# Patient Record
Sex: FEMALE | Race: WHITE | NOT HISPANIC OR LATINO | Employment: FULL TIME | URBAN - METROPOLITAN AREA
[De-identification: names, ages, dates, MRNs, and addresses within clinical notes are randomized per-mention and may not be internally consistent; named-entity substitution may affect disease eponyms.]

---

## 2019-03-17 ENCOUNTER — HOSPITAL ENCOUNTER (EMERGENCY)
Facility: HOSPITAL | Age: 64
Discharge: HOME | End: 2019-03-17
Attending: EMERGENCY MEDICINE | Admitting: EMERGENCY MEDICINE

## 2019-03-17 VITALS
HEART RATE: 83 BPM | WEIGHT: 172.2 LBS | BODY MASS INDEX: 27.68 KG/M2 | HEIGHT: 66 IN | DIASTOLIC BLOOD PRESSURE: 65 MMHG | SYSTOLIC BLOOD PRESSURE: 108 MMHG | TEMPERATURE: 98.3 F | RESPIRATION RATE: 16 BRPM | OXYGEN SATURATION: 94 %

## 2019-03-17 DIAGNOSIS — R10.9 ABDOMINAL CRAMPS: ICD-10-CM

## 2019-03-17 DIAGNOSIS — R21 RASH: ICD-10-CM

## 2019-03-17 DIAGNOSIS — R11.0 NAUSEA: Primary | ICD-10-CM

## 2019-03-17 LAB
ALBUMIN SERPL-MCNC: 3.8 G/DL (ref 3.5–5.2)
ALBUMIN/GLOB SERPL: 1.1 G/DL
ALP SERPL-CCNC: 71 U/L (ref 39–117)
ALT SERPL W P-5'-P-CCNC: 15 U/L (ref 1–33)
ANION GAP SERPL CALCULATED.3IONS-SCNC: 11.6 MMOL/L
AST SERPL-CCNC: 19 U/L (ref 1–32)
BACTERIA UR QL AUTO: ABNORMAL /HPF
BASOPHILS # BLD AUTO: 0.01 10*3/MM3 (ref 0–0.2)
BASOPHILS NFR BLD AUTO: 0.1 % (ref 0–1.5)
BILIRUB SERPL-MCNC: 0.2 MG/DL (ref 0.1–1.2)
BILIRUB UR QL STRIP: NEGATIVE
BUN BLD-MCNC: 13 MG/DL (ref 8–23)
BUN/CREAT SERPL: 20.6 (ref 7–25)
CALCIUM SPEC-SCNC: 9.4 MG/DL (ref 8.6–10.5)
CHLORIDE SERPL-SCNC: 101 MMOL/L (ref 98–107)
CLARITY UR: CLEAR
CO2 SERPL-SCNC: 27.4 MMOL/L (ref 22–29)
COLOR UR: ABNORMAL
CREAT BLD-MCNC: 0.63 MG/DL (ref 0.57–1)
DEPRECATED RDW RBC AUTO: 47.2 FL (ref 37–54)
EOSINOPHIL # BLD AUTO: 0.27 10*3/MM3 (ref 0–0.4)
EOSINOPHIL NFR BLD AUTO: 3.1 % (ref 0.3–6.2)
ERYTHROCYTE [DISTWIDTH] IN BLOOD BY AUTOMATED COUNT: 13.9 % (ref 12.3–15.4)
GFR SERPL CREATININE-BSD FRML MDRD: 96 ML/MIN/1.73
GLOBULIN UR ELPH-MCNC: 3.5 GM/DL
GLUCOSE BLD-MCNC: 115 MG/DL (ref 65–99)
GLUCOSE UR STRIP-MCNC: NEGATIVE MG/DL
HCT VFR BLD AUTO: 39.9 % (ref 34–46.6)
HGB BLD-MCNC: 12.3 G/DL (ref 12–15.9)
HGB UR QL STRIP.AUTO: NEGATIVE
HYALINE CASTS UR QL AUTO: ABNORMAL /LPF
IMM GRANULOCYTES # BLD AUTO: 0.03 10*3/MM3 (ref 0–0.05)
IMM GRANULOCYTES NFR BLD AUTO: 0.3 % (ref 0–0.5)
KETONES UR QL STRIP: ABNORMAL
LEUKOCYTE ESTERASE UR QL STRIP.AUTO: ABNORMAL
LIPASE SERPL-CCNC: 8 U/L (ref 13–60)
LYMPHOCYTES # BLD AUTO: 1.22 10*3/MM3 (ref 0.7–3.1)
LYMPHOCYTES NFR BLD AUTO: 14.1 % (ref 19.6–45.3)
MCH RBC QN AUTO: 28.5 PG (ref 26.6–33)
MCHC RBC AUTO-ENTMCNC: 30.8 G/DL (ref 31.5–35.7)
MCV RBC AUTO: 92.6 FL (ref 79–97)
MONOCYTES # BLD AUTO: 0.51 10*3/MM3 (ref 0.1–0.9)
MONOCYTES NFR BLD AUTO: 5.9 % (ref 5–12)
MUCOUS THREADS URNS QL MICRO: ABNORMAL /HPF
NEUTROPHILS # BLD AUTO: 6.59 10*3/MM3 (ref 1.4–7)
NEUTROPHILS NFR BLD AUTO: 76.5 % (ref 42.7–76)
NITRITE UR QL STRIP: NEGATIVE
NRBC BLD AUTO-RTO: 0 /100 WBC (ref 0–0)
PH UR STRIP.AUTO: 7 [PH] (ref 5–8)
PLATELET # BLD AUTO: 386 10*3/MM3 (ref 140–450)
PMV BLD AUTO: 9.4 FL (ref 6–12)
POTASSIUM BLD-SCNC: 4.1 MMOL/L (ref 3.5–5.2)
PROT SERPL-MCNC: 7.3 G/DL (ref 6–8.5)
PROT UR QL STRIP: ABNORMAL
RBC # BLD AUTO: 4.31 10*6/MM3 (ref 3.77–5.28)
RBC # UR: ABNORMAL /HPF
REF LAB TEST METHOD: ABNORMAL
SODIUM BLD-SCNC: 140 MMOL/L (ref 136–145)
SP GR UR STRIP: >=1.03 (ref 1–1.03)
SQUAMOUS #/AREA URNS HPF: ABNORMAL /HPF
UROBILINOGEN UR QL STRIP: ABNORMAL
WBC CLUMPS # UR AUTO: ABNORMAL /HPF
WBC NRBC COR # BLD: 8.63 10*3/MM3 (ref 3.4–10.8)
WBC UR QL AUTO: ABNORMAL /HPF

## 2019-03-17 PROCEDURE — 96374 THER/PROPH/DIAG INJ IV PUSH: CPT

## 2019-03-17 PROCEDURE — 83690 ASSAY OF LIPASE: CPT | Performed by: PHYSICIAN ASSISTANT

## 2019-03-17 PROCEDURE — 80053 COMPREHEN METABOLIC PANEL: CPT | Performed by: PHYSICIAN ASSISTANT

## 2019-03-17 PROCEDURE — 25010000002 METHYLPREDNISOLONE PER 125 MG: Performed by: PHYSICIAN ASSISTANT

## 2019-03-17 PROCEDURE — 81001 URINALYSIS AUTO W/SCOPE: CPT | Performed by: PHYSICIAN ASSISTANT

## 2019-03-17 PROCEDURE — 96375 TX/PRO/DX INJ NEW DRUG ADDON: CPT

## 2019-03-17 PROCEDURE — 25010000002 HYDROMORPHONE PER 4 MG: Performed by: EMERGENCY MEDICINE

## 2019-03-17 PROCEDURE — 25010000002 ONDANSETRON PER 1 MG: Performed by: PHYSICIAN ASSISTANT

## 2019-03-17 PROCEDURE — 99283 EMERGENCY DEPT VISIT LOW MDM: CPT

## 2019-03-17 PROCEDURE — 96361 HYDRATE IV INFUSION ADD-ON: CPT

## 2019-03-17 PROCEDURE — 85025 COMPLETE CBC W/AUTO DIFF WBC: CPT | Performed by: PHYSICIAN ASSISTANT

## 2019-03-17 RX ORDER — CLOBETASOL PROPIONATE 0.5 MG/G
CREAM TOPICAL 2 TIMES DAILY
COMMUNITY
End: 2020-03-31

## 2019-03-17 RX ORDER — ONDANSETRON 4 MG/1
4-8 TABLET, ORALLY DISINTEGRATING ORAL EVERY 8 HOURS PRN
Qty: 15 TABLET | Refills: 0 | Status: SHIPPED | OUTPATIENT
Start: 2019-03-17 | End: 2020-03-31

## 2019-03-17 RX ORDER — MESALAMINE 1.2 G/1
1200 TABLET, DELAYED RELEASE ORAL
COMMUNITY
End: 2020-03-27 | Stop reason: SDUPTHER

## 2019-03-17 RX ORDER — METHYLPREDNISOLONE SODIUM SUCCINATE 125 MG/2ML
125 INJECTION, POWDER, LYOPHILIZED, FOR SOLUTION INTRAMUSCULAR; INTRAVENOUS ONCE
Status: COMPLETED | OUTPATIENT
Start: 2019-03-17 | End: 2019-03-17

## 2019-03-17 RX ORDER — CARVEDILOL 12.5 MG/1
12.5 TABLET ORAL 2 TIMES DAILY WITH MEALS
COMMUNITY
End: 2022-05-02

## 2019-03-17 RX ORDER — DICYCLOMINE HYDROCHLORIDE 10 MG/ML
20 INJECTION INTRAMUSCULAR ONCE
Status: DISCONTINUED | OUTPATIENT
Start: 2019-03-17 | End: 2019-03-17

## 2019-03-17 RX ORDER — ONDANSETRON 2 MG/ML
4 INJECTION INTRAMUSCULAR; INTRAVENOUS ONCE
Status: COMPLETED | OUTPATIENT
Start: 2019-03-17 | End: 2019-03-17

## 2019-03-17 RX ORDER — PREDNISONE 20 MG/1
TABLET ORAL
Qty: 13 TABLET | Refills: 0 | Status: SHIPPED | OUTPATIENT
Start: 2019-03-17 | End: 2020-03-31

## 2019-03-17 RX ORDER — HYDROMORPHONE HYDROCHLORIDE 1 MG/ML
0.5 INJECTION, SOLUTION INTRAMUSCULAR; INTRAVENOUS; SUBCUTANEOUS ONCE
Status: COMPLETED | OUTPATIENT
Start: 2019-03-17 | End: 2019-03-17

## 2019-03-17 RX ORDER — LISINOPRIL 5 MG/1
5 TABLET ORAL DAILY
COMMUNITY
End: 2022-12-19

## 2019-03-17 RX ORDER — HYDROXYZINE HYDROCHLORIDE 25 MG/1
25-50 TABLET, FILM COATED ORAL EVERY 6 HOURS PRN
Qty: 30 TABLET | Refills: 0 | Status: SHIPPED | OUTPATIENT
Start: 2019-03-17 | End: 2020-03-31 | Stop reason: ALTCHOICE

## 2019-03-17 RX ADMIN — HYDROMORPHONE HYDROCHLORIDE 0.5 MG: 1 INJECTION, SOLUTION INTRAMUSCULAR; INTRAVENOUS; SUBCUTANEOUS at 15:58

## 2019-03-17 RX ADMIN — SODIUM CHLORIDE, POTASSIUM CHLORIDE, SODIUM LACTATE AND CALCIUM CHLORIDE 1000 ML: 600; 310; 30; 20 INJECTION, SOLUTION INTRAVENOUS at 15:58

## 2019-03-17 RX ADMIN — ONDANSETRON HYDROCHLORIDE 4 MG: 2 SOLUTION INTRAMUSCULAR; INTRAVENOUS at 15:58

## 2019-03-17 RX ADMIN — METHYLPREDNISOLONE SODIUM SUCCINATE 125 MG: 125 INJECTION, POWDER, FOR SOLUTION INTRAMUSCULAR; INTRAVENOUS at 15:58

## 2019-03-17 NOTE — ED NOTES
"Pt complains of nausea since last night, denies vomiting. Also states she feels bloated and has abdominal pain. Denies diarrhea. Denies fever. Pt also complains of rash all over for \"months\". Pt was prescribed a steroid cream for rash but states is has not helped     Aric Preston, RN  03/17/19 3531    "

## 2019-03-17 NOTE — DISCHARGE INSTRUCTIONS
Rest and drink plenty of fluids. Progress your diet slowly as tolerated.  Recheck with your PCP in 1-2 days.  Take the medications as prescribed.  If you have severe diarrhea, Imodium may help, but could also prolong your symptoms.  Keep your skin moisturized, treat your sores with antibiotic ointment and dressings and do everything you can to avoid scratching.  Return to the ER for severe pain, intractable vomiting, fever >100.4, new or worsening symptoms, any concerns.

## 2019-03-17 NOTE — ED PROVIDER NOTES
Pt presents to the ED c/o intermittent episodes of nausea w/ associated fatigue and generalied weakness. She did c/o abdominal pain but reports it has resolved. Pt also noticed a diffuse rash that itches that has been present since 09/2018. She has been unable to see the dermatologist as her appointment was set 6 months out. She was prescribed by steroids and creams by her PCP. She reports the rash would improve, but come back worse. Denies any others in the house having similar sx.    PHYSICAL EXAM  GENERAL: not distressed  HENT: nares patent, dry mucus membranes  EYES: EOMI, PERRL, pale conjunctiva  NECK: FROM  CV: regular rhythm, regular rate  RESPIRATORY: normal effort  ABDOMEN: soft, nontender, diminished bs  MUSCULOSKELETAL: no deformity  NEURO: alert, oriented X 3  SKIN: warm, dry, multiple small excoriated lesion on extremities and back that she reports have been present for 3 months.    Vital signs and nursing notes reviewed.    LAB RESULTS  I have reviewed the patient's labs.    PROGRESS NOTES  [id number]  Spoke to midlevel provider Mercedez Miles PA-C, about the pt. After performing my own physical exam, I agree w/ the plan of care.    Attestation:    The WALKER and I have discussed this patient's history, physical exam, and treatment plan.  I have reviewed the documentation and personally had a face to face interaction with the patient. I affirm the documentation and agree with the treatment and plan.  The attached note describes my personal findings.    Documentation assistance provided by donta Perales for Dr. Narayan Information recorded by the scribe was done at my direction and has been verified and validated by me.     Katie Perales  03/17/19 2600       Kennedy Narayan MD  03/18/19 5366

## 2019-03-17 NOTE — ED PROVIDER NOTES
"EMERGENCY DEPARTMENT ENCOUNTER    Room Number:  26/26  Date seen:  3/18/2019  Time seen: 3:17 PM  PCP: Celi Restrepo MD  Historian: Patient, Family      HPI:  Chief Complaint: Nausea  Context: Geetha Lazcano is a 64 y.o. female who presents to the ED c/o intermittent episodes of nausea onset last night. Pt reports that she has also had \"cramping\" generalized abdominal pain, subjective fever, and headache (intermittent; gradual in onset). Pt denies vomiting, diarrhea, chest pain, dyspnea, dysuria, and neck pain/stiffness. Pt reports that she has had recent sick contact from her , who is currently recovering from a \"gastrointestinal virus\". Per family, pt has also had a skin rash on her back for the past several months. There are no other complaints at this time.     Onset: Gradual in onset  Location: Gastrointestinal  Radiation: N/A  Duration: Onset last night  Timing: Intermittent  Character: \"nausea\"  Aggravating Factors: Nothing  Alleviating Factors: Nothing  Severity: Moderate          ALLERGIES  Latex and Metronidazole    PAST MEDICAL HISTORY  Active Ambulatory Problems     Diagnosis Date Noted   • No Active Ambulatory Problems     Resolved Ambulatory Problems     Diagnosis Date Noted   • No Resolved Ambulatory Problems     Past Medical History:   Diagnosis Date   • Mitral valve prolapse        PAST SURGICAL HISTORY  Past Surgical History:   Procedure Laterality Date   • HYSTERECTOMY     • PACEMAKER IMPLANTATION     • TONSILLECTOMY         FAMILY HISTORY  History reviewed. No pertinent family history.    SOCIAL HISTORY  Social History     Socioeconomic History   • Marital status:      Spouse name: Not on file   • Number of children: Not on file   • Years of education: Not on file   • Highest education level: Not on file   Social Needs   • Financial resource strain: Not on file   • Food insecurity - worry: Not on file   • Food insecurity - inability: Not on file   • Transportation needs - " medical: Not on file   • Transportation needs - non-medical: Not on file   Occupational History   • Not on file   Tobacco Use   • Smoking status: Never Smoker   Substance and Sexual Activity   • Alcohol use: No     Frequency: Never   • Drug use: Not on file   • Sexual activity: Not on file   Other Topics Concern   • Not on file   Social History Narrative   • Not on file           REVIEW OF SYSTEMS  Review of Systems   Constitutional: Positive for fever (subjective).   HENT: Negative for congestion, rhinorrhea and sore throat.    Eyes: Negative for pain.   Respiratory: Negative for cough and shortness of breath.    Cardiovascular: Negative for chest pain, palpitations and leg swelling.   Gastrointestinal: Positive for abdominal pain and nausea. Negative for diarrhea and vomiting.   Genitourinary: Negative for difficulty urinating, dysuria, flank pain and frequency.   Musculoskeletal: Negative for myalgias, neck pain and neck stiffness.   Skin: Positive for rash.   Neurological: Positive for headaches. Negative for dizziness, speech difficulty, weakness, light-headedness and numbness.   Psychiatric/Behavioral: Negative.    All other systems reviewed and are negative.          PHYSICAL EXAM  ED Triage Vitals   Temp Heart Rate Resp BP SpO2   03/17/19 1444 03/17/19 1444 03/17/19 1444 03/17/19 1455 03/17/19 1444   98.3 °F (36.8 °C) 87 15 103/66 98 %      Temp src Heart Rate Source Patient Position BP Location FiO2 (%)   03/17/19 1444 -- -- -- --   Tympanic         Physical Exam   Constitutional: She is oriented to person, place, and time. No distress.   HENT:   Head: Normocephalic.   Mouth/Throat: Mucous membranes are normal.   Eyes: EOM are normal. Pupils are equal, round, and reactive to light.   Neck: Normal range of motion. Neck supple.   Cardiovascular: Normal rate, regular rhythm and normal heart sounds.   Pulmonary/Chest: Effort normal and breath sounds normal. No respiratory distress. She has no decreased breath  sounds. She has no wheezes. She has no rhonchi. She has no rales.   Abdominal:   Soft  Mild generalized abdominal tenderness  Nondistended  No rebound, guarding, or rigidity  No appreciable organomegaly  No CVA tenderness     Musculoskeletal: Normal range of motion.   Neurological: She is alert and oriented to person, place, and time. She has normal sensation.   Skin: Skin is warm and dry.   There are scattered excoriated lesions to the trunk and extremities. There are also patches of urticarial lesions primarily to the trunk.    Psychiatric: Mood and affect normal.   Nursing note and vitals reviewed.          LAB RESULTS  Recent Results (from the past 24 hour(s))   Comprehensive Metabolic Panel    Collection Time: 03/17/19  3:46 PM   Result Value Ref Range    Glucose 115 (H) 65 - 99 mg/dL    BUN 13 8 - 23 mg/dL    Creatinine 0.63 0.57 - 1.00 mg/dL    Sodium 140 136 - 145 mmol/L    Potassium 4.1 3.5 - 5.2 mmol/L    Chloride 101 98 - 107 mmol/L    CO2 27.4 22.0 - 29.0 mmol/L    Calcium 9.4 8.6 - 10.5 mg/dL    Total Protein 7.3 6.0 - 8.5 g/dL    Albumin 3.80 3.50 - 5.20 g/dL    ALT (SGPT) 15 1 - 33 U/L    AST (SGOT) 19 1 - 32 U/L    Alkaline Phosphatase 71 39 - 117 U/L    Total Bilirubin 0.2 0.1 - 1.2 mg/dL    eGFR Non African Amer 96 >60 mL/min/1.73    Globulin 3.5 gm/dL    A/G Ratio 1.1 g/dL    BUN/Creatinine Ratio 20.6 7.0 - 25.0    Anion Gap 11.6 mmol/L   Lipase    Collection Time: 03/17/19  3:46 PM   Result Value Ref Range    Lipase 8 (L) 13 - 60 U/L   CBC Auto Differential    Collection Time: 03/17/19  3:46 PM   Result Value Ref Range    WBC 8.63 3.40 - 10.80 10*3/mm3    RBC 4.31 3.77 - 5.28 10*6/mm3    Hemoglobin 12.3 12.0 - 15.9 g/dL    Hematocrit 39.9 34.0 - 46.6 %    MCV 92.6 79.0 - 97.0 fL    MCH 28.5 26.6 - 33.0 pg    MCHC 30.8 (L) 31.5 - 35.7 g/dL    RDW 13.9 12.3 - 15.4 %    RDW-SD 47.2 37.0 - 54.0 fl    MPV 9.4 6.0 - 12.0 fL    Platelets 386 140 - 450 10*3/mm3    Neutrophil % 76.5 (H) 42.7 - 76.0 %     Lymphocyte % 14.1 (L) 19.6 - 45.3 %    Monocyte % 5.9 5.0 - 12.0 %    Eosinophil % 3.1 0.3 - 6.2 %    Basophil % 0.1 0.0 - 1.5 %    Immature Grans % 0.3 0.0 - 0.5 %    Neutrophils, Absolute 6.59 1.40 - 7.00 10*3/mm3    Lymphocytes, Absolute 1.22 0.70 - 3.10 10*3/mm3    Monocytes, Absolute 0.51 0.10 - 0.90 10*3/mm3    Eosinophils, Absolute 0.27 0.00 - 0.40 10*3/mm3    Basophils, Absolute 0.01 0.00 - 0.20 10*3/mm3    Immature Grans, Absolute 0.03 0.00 - 0.05 10*3/mm3    nRBC 0.0 0.0 - 0.0 /100 WBC   Urinalysis With Microscopic If Indicated (No Culture) - Urine, Clean Catch    Collection Time: 03/17/19  5:21 PM   Result Value Ref Range    Color, UA Dark Yellow (A) Yellow, Straw    Appearance, UA Clear Clear    pH, UA 7.0 5.0 - 8.0    Specific Gravity, UA >=1.030 1.005 - 1.030    Glucose, UA Negative Negative    Ketones, UA Trace (A) Negative    Bilirubin, UA Negative Negative    Blood, UA Negative Negative    Protein, UA Trace (A) Negative    Leuk Esterase, UA Large (3+) (A) Negative    Nitrite, UA Negative Negative    Urobilinogen, UA 1.0 E.U./dL 0.2 - 1.0 E.U./dL   Urinalysis, Microscopic Only - Urine, Clean Catch    Collection Time: 03/17/19  5:21 PM   Result Value Ref Range    RBC, UA None Seen None Seen, 0-2 /HPF    WBC, UA 21-30 (A) None Seen, 0-2 /HPF    Bacteria, UA None Seen None Seen /HPF    Squamous Epithelial Cells, UA 3-6 (A) None Seen, 0-2 /HPF    Hyaline Casts, UA 0-2 None Seen /LPF    Mucus, UA Moderate/2+ (A) None Seen, Trace /HPF    WBC Clumps, UA Small/1+ None Seen /HPF    Methodology Manual Light Microscopy        I ordered the above labs and reviewed the results.        MEDICATIONS GIVEN IN ER  Medications   lactated ringers bolus 1,000 mL (0 mL Intravenous Stopped 3/17/19 1904)   ondansetron (ZOFRAN) injection 4 mg (4 mg Intravenous Given 3/17/19 0188)   HYDROmorphone (DILAUDID) injection 0.5 mg (0.5 mg Intravenous Given 3/17/19 1558)   methylPREDNISolone sodium succinate (SOLU-Medrol) injection  "125 mg (125 mg Intravenous Given 3/17/19 1558)           PROCEDURES  Procedures          PROGRESS AND CONSULTS    Progress Notes:          3:29 PM:  Blood work and UA ordered for further evaluation. Bentyl and Dilaudid ordered to treat for abdominal pain. Zofran ordered to treat for nausea. Lactated ringers ordered to hydrate pt. Steroids ordered to treat for pt's skin rash.     5:42 PM:  Reviewed pt's history and workup with Dr. Narayan (ER physician).  After a bedside evaluation, Dr. Narayan agrees with the plan of care.    6:30 PM:  Rechecked pt. Pt is resting comfortably and appears in no acute distress. Pt denies vomiting in the ER and reports that she feels better after administration of ER treatment. Informed pt that her BUN and creatinine are WNL. WBC is WNL. Pt will be prescribed with rx for Hydroxyzine, steroid taper, and Zofran. Pt will be provided with f/u referral to the dermatologist. Pt was advised to hydrate, to advance her diet gradually as tolerated, and to f/u with her PMD in the office. Strict RTER warnings given. Pt agrees with plan for discharge.                Disposition vitals:  /68 (BP Location: Right arm, Patient Position: Sitting)   Pulse 87   Temp 98.3 °F (36.8 °C) (Tympanic)   Resp 15   Ht 167.6 cm (66\")   Wt 78.1 kg (172 lb 3.2 oz)   SpO2 96%   BMI 27.79 kg/m²           DIAGNOSIS  Final diagnoses:   Nausea   Abdominal cramps   Rash           DISPOSITION  Pt discharged.    DISCHARGE    Patient discharged in stable condition.    Reviewed implications of results, diagnosis, meds, responsibility to follow up, warning signs and symptoms of possible worsening, potential complications and reasons to return to ER.    Patient/Family voiced understanding of above instructions.    Discussed plan for discharge, as there is no emergent indication for admission. Pt/family is agreeable and understands need for follow up and repeat testing. Pt is aware that discharge does not mean that " nothing is wrong but it indicates no emergency is present that requires admission and they must continue care with follow-up as given below or physician of their choice.     FOLLOW-UP  Celi Restrepo MD  [address]  [facility] [address]  [phone]    In 2 days      Radha Busch MD  [address] [facility] [address]  [facility] [address] [phone]    Schedule an appointment as soon as possible for a visit            Medication List      New Prescriptions    hydrOXYzine 25 MG tablet  Commonly known as:  ATARAX  Take 1-2 tablets by mouth Every 6 (Six) Hours As Needed for Itching.     ondansetron ODT 4 MG disintegrating tablet  Commonly known as:  ZOFRAN-ODT  Take 1-2 tablets by mouth Every 8 (Eight) Hours As Needed for Nausea or   Vomiting.     predniSONE 20 MG tablet  Commonly known as:  DELTASONE  3 tabs po once daily on days 1-2  2 tabs po once daily on days 3-4  1 tab po once daily on days 5-6  1/2 tab once daily on days 7-8                Documentation assistance provided by donta Huffman for Ms. Cony Miles PA-C.  Information recorded by the donta was done at my direction and has been verified and validated by me.              Miah Huffman  03/18/19 1885       Cony Miles PA  03/19/19 1695

## 2020-03-27 ENCOUNTER — TELEPHONE (OUTPATIENT)
Dept: GASTROENTEROLOGY | Facility: CLINIC | Age: 65
End: 2020-03-27

## 2020-03-27 RX ORDER — MESALAMINE 1.2 G/1
4.8 TABLET, DELAYED RELEASE ORAL DAILY
Qty: 360 TABLET | Refills: 0 | Status: SHIPPED | OUTPATIENT
Start: 2020-03-27 | End: 2020-06-15

## 2020-03-27 NOTE — TELEPHONE ENCOUNTER
Spoke with patient, she is requesting a refill on mesalamine 1.2g 4 PO daily which she has been out of for 1 month now. Records scanned into her chart. Please advise.         TS

## 2020-03-27 NOTE — TELEPHONE ENCOUNTER
Rx sent in. Nothing further needed at this time. Patient will call with an update next week Monday.      TS

## 2020-03-30 ENCOUNTER — TELEPHONE (OUTPATIENT)
Dept: GASTROENTEROLOGY | Facility: CLINIC | Age: 65
End: 2020-03-30

## 2020-03-30 NOTE — TELEPHONE ENCOUNTER
Pt aware and appt made. Advised pt on how to do this and if she needs help to call about 30 min before to help.

## 2020-03-30 NOTE — TELEPHONE ENCOUNTER
Spoke with pt, states that she is having all over joint pain, urgency, diarrhea, worse in the morning, dry heaves, mucus in stool and some blood but not seen here lately. Watery liquid diarrhea with incontinence.     Taking Liadla 4 tabs daily.     Please advise.

## 2020-03-30 NOTE — TELEPHONE ENCOUNTER
Since I cannot see her in the office due to the pandemic, please schedule telemedicine office visit tomorrow at patient's convenience.    Please inform patient how to download the [employer] mojgan and fill out necessary paperwork prior to office visit.    Jeremías

## 2020-03-31 ENCOUNTER — TELEMEDICINE (OUTPATIENT)
Dept: GASTROENTEROLOGY | Facility: CLINIC | Age: 65
End: 2020-03-31

## 2020-03-31 DIAGNOSIS — M25.50 ARTHRALGIA, UNSPECIFIED JOINT: ICD-10-CM

## 2020-03-31 DIAGNOSIS — K51.011 ULCERATIVE PANCOLITIS WITH RECTAL BLEEDING (HCC): Primary | ICD-10-CM

## 2020-03-31 DIAGNOSIS — Z79.899 HIGH RISK MEDICATION USE: ICD-10-CM

## 2020-03-31 PROBLEM — I10 ESSENTIAL HYPERTENSION: Status: ACTIVE | Noted: 2018-07-11

## 2020-03-31 PROBLEM — E78.2 HYPERCHOLESTEROLEMIA WITH HYPERTRIGLYCERIDEMIA: Status: ACTIVE | Noted: 2018-07-11

## 2020-03-31 PROCEDURE — G2063 QUAL NONMD EST PT 21>MIN: HCPCS | Performed by: NURSE PRACTITIONER

## 2020-03-31 RX ORDER — METHYLPREDNISOLONE 4 MG/1
TABLET ORAL
Qty: 21 TABLET | Refills: 0 | Status: SHIPPED | OUTPATIENT
Start: 2020-03-31 | End: 2021-04-26

## 2020-03-31 NOTE — PROGRESS NOTES
Chief Complaint   Patient presents with   • Follow-up     Ulcerative colitis, diarrhea, joint pain       HPI  65-year-old female presents today for follow-up.  Last office visit August 27, 2019.  Colonoscopy August 5, 2019 during hospitalization for Crohn's flare.  She was discharged on prednisone.  At her last office visit August 27, 2019 treatment options for ulcerative colitis were discussed in detail.  Patient has had her first 3 doses of infliximab.  Her third induction dose was February 8, 2020.  She has not had her maintenance infusion.    Patient contacted the office yesterday with complaints of joint pain, increased frequency and diarrhea.  She had been off of her mesalamine for 1 month.  She restarted it on Friday.  Joint pain started approximately 1 week ago.  It is located in all of her joints.  There is mild swelling but no heat.  She denies fever or chills.  Denies cough.  Denies shortness of breath.  She does not smoke.  She has not been around anyone diagnosed with COVID-19.    She is under increased stress with her employer and her 's illness and reports depression over the past several weeks and months.  She has been treated in the past with an antidepressive medication with her primary care provider but this caused somnolence and was not well tolerated.  She has not tried any other medications.  She denies thoughts of self-harm or harm to others.    Starting approximately 2 weeks ago she noticed more frequent bowel movements.  She can have looser stools with urgency throughout the day.  1 week ago she had nocturnal bowel movement at 2 AM.  She had bright red blood per rectum for 2 days but this self resolved.  She is wondering if symptoms are related to discontinuing her mesalamine.    She denies nausea or vomiting.  Denies acid reflux.    She does experience gas and bloating that improves with anti-gas medications.  She denies abdominal pain or cramping.  She will use antidiarrhea  medications at times for urgency.    She denies tobacco use.    Patient denies nonhealing skin lesions or nodules on her lower extremities.    Previous treatments include azathioprine however patient discontinued the medication in 2017 or early 2018 as she was concerned about medication side effects.  Also previously treated with mesalamine.    Review of records.  Colonoscopy with moderate erythema and granularity of the colonic mucosa extending from the rectum to cecum, no ulcers, terminal ileum appeared nodule but otherwise normal.  Stomach biopsies with chronic inactive gastritis negative for H. pylori or intestinal metaplasia.  Pathology.  Terminal ileum with no diagnostic alteration.  Right colon and transverse colon with moderately active chronic colitis, left colon moderately active chronic colitis and rectum mildly active chronic colitis.  Overall findings consistent with clinical history of chronic idiopathic inflammatory bowel disease.  No granulomas, viral cytopathic change or dysplasia.    CT scan August 3, 2019 mild diffuse colitis, no obstruction, herniated bowel loops or dilated colonic diverticulosis.    Fecal calprotectin 3176, C. difficile negative, CRP 14.10, hemoglobin 12.0, platelet 443, WBC 8.2.      Review of Systems   Constitutional: Negative.    HENT: Negative.    Eyes: Negative.    Respiratory: Negative.    Cardiovascular: Negative.    Gastrointestinal: Positive for blood in stool and diarrhea.   Endocrine: Negative.    Genitourinary: Negative.    Musculoskeletal: Positive for arthralgias, joint swelling and myalgias.   Skin: Negative.    Allergic/Immunologic: Positive for immunocompromised state.   Neurological: Negative.    Hematological: Negative.    Psychiatric/Behavioral: The patient is nervous/anxious.        Problem List:    Patient Active Problem List   Diagnosis   • Essential hypertension   • Hypercholesterolemia with hypertriglyceridemia   • Ulcerative colitis with rectal bleeding  (CMS/HCC)   • High risk medication use   • Joint pain       Medical History:    Past Medical History:   Diagnosis Date   • Anemia     when i was a child   • Clotting disorder (CMS/HCC)     with bad flare up of UC   • Coronary artery disease     2010?   • Essential hypertension 7/11/2018   • Hypercholesterolemia with hypertriglyceridemia 7/11/2018   • Irritable bowel syndrome     before i developed UC   • Lactose intolerance     before i had ibs-just didnt realize it then.   • Mitral valve prolapse    • Ulcerative colitis (CMS/HCC)     so long i cant remember        Social History:    Social History     Socioeconomic History   • Marital status:      Spouse name: Not on file   • Number of children: Not on file   • Years of education: Not on file   • Highest education level: Not on file   Tobacco Use   • Smoking status: Never Smoker   Substance and Sexual Activity   • Alcohol use: No     Frequency: Never   • Drug use: Never   • Sexual activity: Yes     Partners: Male     Birth control/protection: None     Comment: hysterectomy       Family History: No family history on file.    Surgical History:   Past Surgical History:   Procedure Laterality Date   • HYSTERECTOMY     • PACEMAKER IMPLANTATION     • TONSILLECTOMY           Current Outpatient Medications:   •  carvedilol (COREG) 12.5 MG tablet, Take 12.5 mg by mouth 2 (Two) Times a Day With Meals., Disp: , Rfl:   •  lisinopril (PRINIVIL,ZESTRIL) 5 MG tablet, Take 5 mg by mouth Daily., Disp: , Rfl:   •  mesalamine (LIALDA) 1.2 g EC tablet, Take 4 tablets by mouth Daily., Disp: 360 tablet, Rfl: 0  •  methylPREDNISolone (MEDROL, TIRSO,) 4 MG tablet, Take as directed on package instructions., Disp: 21 tablet, Rfl: 0    Allergies:  Latex and Metronidazole    The following portions of the patient's history were reviewed and updated as appropriate: allergies, current medications, past family history, past medical history, past social history, past surgical history and  problem list.    Physical Exam   Constitutional: She is oriented to person, place, and time. She appears well-developed and well-nourished.   Pulmonary/Chest: Effort normal.   Neurological: She is alert and oriented to person, place, and time.   Psychiatric:   Patient is tearful at times during today's visit       Assessment/ Plan  Geetha was seen today for follow-up.    Diagnoses and all orders for this visit:    Ulcerative pancolitis with rectal bleeding (CMS/HCC)  -     methylPREDNISolone (MEDROL, TIRSO,) 4 MG tablet; Take as directed on package instructions.    High risk medication use    Arthralgia, unspecified joint  -     methylPREDNISolone (MEDROL, TIRSO,) 4 MG tablet; Take as directed on package instructions.         Joint pain and ulcerative colitis, start Medrol Dosepak, take as prescribed    Our office will contact you April 6, 2020 with an update regarding joint pain.  If joint pain is improved, would recommend proceeding with Remicade infusion, if joint pain is not improved, would recommend follow-up with primary care provider as discussed.    Okay to continue Gas-X and antidiarrhea medications as needed for symptoms.    Continue mesalamine treatment as prescribed.    We will contact your home health infusion nurse and obtain blood work with your next Remicade infusion.    Please follow-up with primary care provider if stress and related symptoms continue to worsen as overall these can have negative effects on your health and mental wellbeing as discussed.      Discussion:  Patient has completed induction dose of Remicade.  We will contact home health nurse to obtain basic blood work and check on availability of therapeutic drug monitoring for infliximab level after she has had her first maintenance infusion, prior to her second maintenance infusion at 8-week interval.    Patient is under a significant amount of stress due to to the virus, her 's illness and her employer.  She has tried  antidepressants in the past but they have caused somnolence and were not tolerated.  Encouraged her to follow-up with primary care provider if symptoms persist for additional options to consider.    We will start Medrol Dosepak and arrange for first maintenance infusion of infliximab for possible arthralgias related to inflammatory bowel disease.  If joint pain does not improve with prednisone or infliximab infusion, patient will need to follow-up with primary care provider for further evaluation.    Discussed CDC recommendations for COVID-19 and immunosuppressive medication and recommend self isolation with strict CDC guidelines for hand hygiene and other measures during the pandemic.    Patient verbalized agreement and understanding with the above plan, all questions answered and support provided.    This visit was completed as a telemedicine video visit due to COVID-19 pandemic.      Addendum.    Spoke with [employer] the company that provides/supplies patient's home health Remicade infusions.  Patient is due for her next infliximab infusion April 6, 2020.    Will fax order for CBC, CMP and CRP to [phone].    [employer] phone number is [phone].    Patient informed of the timing of her Remicade infusion.  Also patient states that since starting prednisone she has had noticeable improvement in joint pain.  Will contact her next Wednesday or Thursday to discuss joint pain and hopefully continued response after infliximab infusion supporting inflammatory process otherwise we will make additional recommendations based on joint pain.  Patient verbalized agreement and understanding.   74.8

## 2020-03-31 NOTE — PATIENT INSTRUCTIONS
Start Medrol Dosepak, take as prescribed    Our office will contact you April 6, 2020 with an update regarding joint pain.  If joint pain is improved, would recommend proceeding with Remicade infusion, if joint pain is not improved, would recommend follow-up with primary care provider as discussed.    Okay to continue Gas-X and antidiarrhea medications as needed for symptoms.    Continue mesalamine treatment as prescribed.    We will contact your home health infusion nurse and obtain blood work with your next Remicade infusion.    Please follow-up with primary care provider if stress and related symptoms continue to worsen as overall these can have negative effects on your health and mental wellbeing as discussed.

## 2020-04-06 ENCOUNTER — PATIENT MESSAGE (OUTPATIENT)
Dept: GASTROENTEROLOGY | Facility: CLINIC | Age: 65
End: 2020-04-06

## 2020-06-01 ENCOUNTER — OFFICE VISIT (OUTPATIENT)
Dept: GASTROENTEROLOGY | Facility: CLINIC | Age: 65
End: 2020-06-01

## 2020-06-01 VITALS
SYSTOLIC BLOOD PRESSURE: 118 MMHG | RESPIRATION RATE: 16 BRPM | BODY MASS INDEX: 29.22 KG/M2 | DIASTOLIC BLOOD PRESSURE: 80 MMHG | TEMPERATURE: 98 F | OXYGEN SATURATION: 99 % | HEART RATE: 94 BPM | WEIGHT: 164.9 LBS | HEIGHT: 63 IN

## 2020-06-01 DIAGNOSIS — Z79.899 HIGH RISK MEDICATION USE: ICD-10-CM

## 2020-06-01 DIAGNOSIS — K51.90 ULCERATIVE COLITIS WITHOUT COMPLICATIONS, UNSPECIFIED LOCATION (HCC): Primary | ICD-10-CM

## 2020-06-01 PROCEDURE — 99213 OFFICE O/P EST LOW 20 MIN: CPT | Performed by: INTERNAL MEDICINE

## 2020-06-01 NOTE — PROGRESS NOTES
Ulcerative Colitis      HPI  Patient here for follow-up of ulcerative colitis.  Patient had her last colonoscopy in August 2019 at which time she was having a flare and had fairly diffuse findings of mild colitis.  Her ileum appeared normal.  Patient has received infliximab and her third induction dose was February 8 of 2020.    Otherwise several months she has had issues with increased stress in her life and has been having significant diarrhea loose stools and urgency as well as a couple episodes of bright red rectal bleeding.  She is also had significant joint pain associated with the ulcerative colitis which seemed to respond to Medrol Dosepak.    Patient reports the Remicade has been helping her symptoms. She reports around 1 week before her infusion is due, she will see some mucus in her stools and have worsening joint pain. She reports occasional very small amount of bleeding, reports it is very faint. She denies abdominal pain. No fever. She generally has 3-4 bowel movements per day.    She has a history of joint pain. She reports this previously responded to steroids. She has had some continued intermittent joint pain on Remicade, but does feel the Remicade has helped her joints.        Review of Systems   Constitutional: Negative for appetite change, chills, diaphoresis, fatigue, fever and unexpected weight change.   HENT: Negative for dental problem, ear pain, mouth sores, rhinorrhea, sore throat and voice change.    Eyes: Negative for pain, redness and visual disturbance.   Respiratory: Negative for cough, chest tightness and wheezing.    Cardiovascular: Negative for chest pain, palpitations and leg swelling.   Endocrine: Negative for cold intolerance, heat intolerance, polydipsia, polyphagia and polyuria.   Genitourinary: Negative for dysuria, frequency, hematuria and urgency.   Musculoskeletal: Negative for arthralgias, back pain, joint swelling, myalgias and neck pain.   Skin: Negative for rash.    Allergic/Immunologic: Negative for environmental allergies, food allergies and immunocompromised state.   Neurological: Negative for dizziness, seizures, weakness, numbness and headaches.   Hematological: Does not bruise/bleed easily.   Psychiatric/Behavioral: Negative for sleep disturbance. The patient is not nervous/anxious.         I have reviewed and confirmed the accuracy of the HPI and ROS as documented by the APRN GAMALIEL Gray     Problem List:    Patient Active Problem List   Diagnosis   • Essential hypertension   • Hypercholesterolemia with hypertriglyceridemia   • Ulcerative colitis with rectal bleeding (CMS/HCC)   • High risk medication use   • Joint pain       Medical History:    Past Medical History:   Diagnosis Date   • Anemia     when i was a child   • Clotting disorder (CMS/HCC)     with bad flare up of UC   • Coronary artery disease     2010?   • Essential hypertension 7/11/2018   • Hypercholesterolemia with hypertriglyceridemia 7/11/2018   • Irritable bowel syndrome     before i developed UC   • Lactose intolerance     before i had ibs-just didnt realize it then.   • Mitral valve prolapse    • Ulcerative colitis (CMS/HCC)     so long i cant remember        Social History:    Social History     Socioeconomic History   • Marital status:      Spouse name: Not on file   • Number of children: Not on file   • Years of education: Not on file   • Highest education level: Not on file   Tobacco Use   • Smoking status: Never Smoker   • Smokeless tobacco: Never Used   Substance and Sexual Activity   • Alcohol use: No     Frequency: Never   • Drug use: Never   • Sexual activity: Yes     Partners: Male     Birth control/protection: None     Comment: hysterectomy       Family History:   Family History   Problem Relation Age of Onset   • Colon cancer Neg Hx    • Colon polyps Neg Hx        Surgical History:   Past Surgical History:   Procedure Laterality Date   • HYSTERECTOMY     • PACEMAKER  IMPLANTATION     • TONSILLECTOMY           Current Outpatient Medications:   •  carvedilol (COREG) 12.5 MG tablet, Take 12.5 mg by mouth 2 (Two) Times a Day With Meals., Disp: , Rfl:   •  lisinopril (PRINIVIL,ZESTRIL) 5 MG tablet, Take 5 mg by mouth Daily., Disp: , Rfl:   •  mesalamine (LIALDA) 1.2 g EC tablet, Take 4 tablets by mouth Daily., Disp: 360 tablet, Rfl: 0  •  methylPREDNISolone (MEDROL, TIRSO,) 4 MG tablet, Take as directed on package instructions., Disp: 21 tablet, Rfl: 0    Allergies:   Allergies   Allergen Reactions   • Latex Hives   • Metronidazole Rash     Rash         The following portions of the patient's history were reviewed and updated as appropriate: allergies, current medications, past family history, past medical history, past social history, past surgical history and problem list.    Vitals:    06/01/20 1421   BP: 118/80   Pulse: 94   Resp: 16   Temp: 98 °F (36.7 °C)   SpO2: 99%         06/01/20  1421   Weight: 74.8 kg (164 lb 14.4 oz)     Body mass index is 29.21 kg/m².      Physical Exam   Abdominal: Soft. Normal appearance and bowel sounds are normal. She exhibits no distension, no pulsatile midline mass and no mass. There is no tenderness. There is no rigidity, no rebound and no guarding.   Vitals reviewed.       Assessment/ Plan  Geetha was seen today for ulcerative colitis.    Diagnoses and all orders for this visit:    Ulcerative colitis without complications, unspecified location (CMS/[facility])    High risk medication use         No follow-ups on file.    Patient Instructions   For ulcerative colitis, continue treatment with Remicade every 8 weeks.    If symptoms continue to return around 1 week before infusion is due, please contact the office and we can consider changing dosing schedule to every 6 weeks.      May also consider checking Remicade levels and for antibody formation if symptoms continue to recur prior to infusions.      Discussion:  Patient is doing well on Remicade.  We  will continue current regimen unless she starts developing pre-infusion symptoms in which case we may check antibodies or increase the frequency of her infusions.    Documentation by Maeve ALSTON acting as a scribe in the following sections on behalf of the billable provider: HPI, ROS, assessment, & plan.

## 2020-06-01 NOTE — PATIENT INSTRUCTIONS
For ulcerative colitis, continue treatment with Remicade every 8 weeks.    If symptoms continue to return around 1 week before infusion is due, please contact the office and we can consider changing dosing schedule to every 6 weeks.

## 2020-06-15 RX ORDER — MESALAMINE 1.2 G/1
TABLET, DELAYED RELEASE ORAL
Qty: 360 TABLET | Refills: 2 | Status: SHIPPED | OUTPATIENT
Start: 2020-06-15 | End: 2021-05-21

## 2021-03-22 ENCOUNTER — BULK ORDERING (OUTPATIENT)
Dept: CASE MANAGEMENT | Facility: OTHER | Age: 66
End: 2021-03-22

## 2021-03-22 DIAGNOSIS — Z23 IMMUNIZATION DUE: ICD-10-CM

## 2021-04-26 ENCOUNTER — OFFICE VISIT (OUTPATIENT)
Dept: OBSTETRICS AND GYNECOLOGY | Facility: CLINIC | Age: 66
End: 2021-04-26

## 2021-04-26 VITALS
BODY MASS INDEX: 31.14 KG/M2 | DIASTOLIC BLOOD PRESSURE: 81 MMHG | SYSTOLIC BLOOD PRESSURE: 129 MMHG | WEIGHT: 175.8 LBS | HEART RATE: 91 BPM

## 2021-04-26 DIAGNOSIS — N81.10 CYSTOCELE WITH RECTOCELE: ICD-10-CM

## 2021-04-26 DIAGNOSIS — Z01.419 ENCOUNTER FOR GYNECOLOGICAL EXAMINATION: Primary | ICD-10-CM

## 2021-04-26 DIAGNOSIS — N81.6 CYSTOCELE WITH RECTOCELE: ICD-10-CM

## 2021-04-26 DIAGNOSIS — Z78.0 POSTMENOPAUSAL STATE: ICD-10-CM

## 2021-04-26 PROCEDURE — 99386 PREV VISIT NEW AGE 40-64: CPT | Performed by: OBSTETRICS & GYNECOLOGY

## 2021-04-26 RX ORDER — DIPHENHYDRAMINE HYDROCHLORIDE 25 MG/1
1 TABLET ORAL DAILY
COMMUNITY
End: 2022-12-19

## 2021-04-26 NOTE — PROGRESS NOTES
Chief Complaint  Gynecologic Exam- Pap- pt states few years   Mammo- pt states 2021 Colonoscopy-2019  DEXA- few years. Pt c/o bladder prolapse     Subjective          Geetha KARTIK Lazcano presents to [facility]  History of Present Illness  Patient is a 66-year-old female that presents as a new patient for gynecological exam.  She has a history of a hysterectomy for prolapse and reports subsequent cystocele and rectocele repair.  She does report that her cystocele has worsened over the years and is bothersome to her.  She did try a pessary several years ago and was not happy with it.  She denies any urinary incontinence.  She does have a history of ulcerative colitis and does have occasional rectal incontinence.  She is currently up-to-date on her screening mammogram.  Last bone density scan in 2016 showed osteopenia.    Objective   Vital Signs:   /81   Pulse 91   Wt 79.7 kg (175 lb 12.8 oz)   BMI 31.14 kg/m²     Physical Exam  Vitals reviewed. Exam conducted with a chaperone present.   Constitutional:       Appearance: She is well-developed.   Cardiovascular:      Rate and Rhythm: Normal rate and regular rhythm.   Pulmonary:      Effort: Pulmonary effort is normal.      Breath sounds: Normal breath sounds.   Chest:      Breasts:         Right: No inverted nipple, mass, nipple discharge or skin change.         Left: No inverted nipple, mass, nipple discharge or skin change.   Abdominal:      General: There is no distension.      Palpations: Abdomen is soft.      Tenderness: There is no abdominal tenderness.   Genitourinary:     Labia:         Right: No rash, tenderness, lesion or injury.         Left: No rash, tenderness, lesion or injury.       Vagina: Prolapsed vaginal walls (Grade 4 cystocele and Grade 2 rectocele) present.      Uterus: Absent.       Adnexa:         Right: No mass, tenderness or fullness.          Left: No mass, tenderness or fullness.     Neurological:      Mental  Status: She is alert.     Review of Systems   Genitourinary: Positive for vaginal pain. Negative for vaginal bleeding.   All other systems reviewed and are negative.                Assessment and Plan    Diagnoses and all orders for this visit:    1. Encounter for gynecological examination (Primary)    2. Postmenopausal state  -     DEXA Bone Density Axial; Future    3. Cystocele with rectocele  -     Ambulatory Referral to Gynecologic Urology    Patient was counseled on monthly self breast exams and yearly screening mammograms for breast health.  She was counseled on calcium and vitamin D for bone health.  Patient has significant prolapse on exam and recommend referral to urogynecology.  Referral was placed.  She may follow-up in 1 year for annual exam or sooner if needed.      Follow Up {Instructions Charge Capture  Follow-up Communications :23}  Return in about 1 year (around 4/26/2022) for Annual physical.  Patient was given instructions and counseling regarding her condition or for health maintenance advice. Please see specific information pulled into the AVS if appropriate.

## 2021-04-26 NOTE — PATIENT INSTRUCTIONS
Bone Health  Bones protect organs, store calcium, anchor muscles, and support the whole body. Keeping your bones strong is important, especially as you get older. You can take actions to help keep your bones strong and healthy.  Why is keeping my bones healthy important?    Keeping your bones healthy is important because your body constantly replaces bone cells. Cells get old, and new cells take their place. As we age, we lose bone cells because the body may not be able to make enough new cells to replace the old cells. The amount of bone cells and bone tissue you have is referred to as bone mass. The higher your bone mass, the stronger your bones.  The aging process leads to an overall loss of bone mass in the body, which can increase the likelihood of:  · Joint pain and stiffness.  · Broken bones.  · A condition in which the bones become weak and brittle (osteoporosis).  A large decline in bone mass occurs in older adults. In women, it occurs about the time of menopause.  What actions can I take to keep my bones healthy?  Good health habits are important for maintaining healthy bones. This includes eating nutritious foods and exercising regularly. To have healthy bones, you need to get enough of the right minerals and vitamins. Most nutrition experts recommend getting these nutrients from the foods that you eat. In some cases, taking supplements may also be recommended. Doing certain types of exercise is also important for bone health.  What are the nutritional recommendations for healthy bones?    Eating a well-balanced diet with plenty of calcium and vitamin D will help to protect your bones. Nutritional recommendations vary from person to person. Ask your health care provider what is healthy for you. Here are some general guidelines.  Get enough calcium  Calcium is the most important (essential) mineral for bone health. Most people can get enough calcium from their diet, but supplements may be recommended for  people who are at risk for osteoporosis. Good sources of calcium include:  · Dairy products, such as low-fat or nonfat milk, cheese, and yogurt.  · Dark green leafy vegetables, such as bok nancy and broccoli.  · Calcium-fortified foods, such as orange juice, cereal, bread, soy beverages, and tofu products.  · Nuts, such as almonds.  Follow these recommended amounts for daily calcium intake:  · Children, age 1-3: 700 mg.  · Children, age 4-8: 1,000 mg.  · Children, age 9-13: 1,300 mg.  · Teens, age 14-18: 1,300 mg.  · Adults, age 19-50: 1,000 mg.  · Adults, age 51-70:  ? Men: 1,000 mg.  ? Women: 1,200 mg.  · Adults, age 71 or older: 1,200 mg.  · Pregnant and breastfeeding females:  ? Teens: 1,300 mg.  ? Adults: 1,000 mg.  Get enough vitamin D  Vitamin D is the most essential vitamin for bone health. It helps the body absorb calcium. Sunlight stimulates the skin to make vitamin D, so be sure to get enough sunlight. If you live in a cold climate or you do not get outside often, your health care provider may recommend that you take vitamin D supplements. Good sources of vitamin D in your diet include:  · Egg yolks.  · Saltwater fish.  · Milk and cereal fortified with vitamin D.  Follow these recommended amounts for daily vitamin D intake:  · Children and teens, age 1-18: 600 international units.  · Adults, age 50 or younger: 400-800 international units.  · Adults, age 51 or older: 800-1,000 international units.  Get other important nutrients  Other nutrients that are important for bone health include:  · Phosphorus. This mineral is found in meat, poultry, dairy foods, nuts, and legumes. The recommended daily intake for adult men and adult women is 700 mg.  · Magnesium. This mineral is found in seeds, nuts, dark green vegetables, and legumes. The recommended daily intake for adult men is 400-420 mg. For adult women, it is 310-320 mg.  · Vitamin K. This vitamin is found in green leafy vegetables. The recommended daily  intake is 120 mg for adult men and 90 mg for adult women.  What type of physical activity is best for building and maintaining healthy bones?  Weight-bearing and strength-building activities are important for building and maintaining healthy bones. Weight-bearing activities cause muscles and bones to work against gravity. Strength-building activities increase the strength of the muscles that support bones. Weight-bearing and muscle-building activities include:  · Walking and hiking.  · Jogging and running.  · Dancing.  · Gym exercises.  · Lifting weights.  · Tennis and racquetball.  · Climbing stairs.  · Aerobics.  Adults should get at least 30 minutes of moderate physical activity on most days. Children should get at least 60 minutes of moderate physical activity on most days. Ask your health care provider what type of exercise is best for you.  How can I find out if my bone mass is low?  Bone mass can be measured with an X-ray test called a bone mineral density (BMD) test. This test is recommended for all women who are age 65 or older. It may also be recommended for:  · Men who are age 70 or older.  · People who are at risk for osteoporosis because of:  ? Having bones that break easily.  ? Having a long-term disease that weakens bones, such as kidney disease or rheumatoid arthritis.  ? Having menopause earlier than normal.  ? Taking medicine that weakens bones, such as steroids, thyroid hormones, or hormone treatment for breast cancer or prostate cancer.  ? Smoking.  ? Drinking three or more alcoholic drinks a day.  If you find that you have a low bone mass, you may be able to prevent osteoporosis or further bone loss by changing your diet and lifestyle.  Where can I find more information?  For more information, check out the following websites:  · National Osteoporosis Foundation: www.nof.org/patients  · National Institutes of Health: www.bones.nih.gov  · International Osteoporosis Foundation:  www.iofbonehealth.org  Summary  · The aging process leads to an overall loss of bone mass in the body, which can increase the likelihood of broken bones and osteoporosis.  · Eating a well-balanced diet with plenty of calcium and vitamin D will help to protect your bones.  · Weight-bearing and strength-building activities are also important for building and maintaining strong bones.  · Bone mass can be measured with an X-ray test called a bone mineral density (BMD) test.  This information is not intended to replace advice given to you by your health care provider. Make sure you discuss any questions you have with your health care provider.  Document Revised: 01/14/2019 Document Reviewed: 01/14/2019  Elsevier Patient Education © 2021 Elsevier Inc.

## 2021-04-29 ENCOUNTER — TELEPHONE (OUTPATIENT)
Dept: OBSTETRICS AND GYNECOLOGY | Facility: CLINIC | Age: 66
End: 2021-04-29

## 2021-04-29 NOTE — TELEPHONE ENCOUNTER
Dexa set for scheduling at [facility].     Pt aware of appt at [facility] on 5/7/21 at 8:30am.

## 2021-04-29 NOTE — TELEPHONE ENCOUNTER
----- Message from Helen Boyer MD sent at 4/26/2021  1:55 PM EDT -----  DEXA ordered and referral sent to urogyn

## 2021-05-14 ENCOUNTER — TELEPHONE (OUTPATIENT)
Dept: GASTROENTEROLOGY | Facility: CLINIC | Age: 66
End: 2021-05-14

## 2021-05-14 NOTE — TELEPHONE ENCOUNTER
Okay for the Covid vaccine.    I would recommend that she discuss the Remicade infusion with her surgeon.  The surgeons generally want patients to hold their biologic for 2 to 3 weeks before the surgery and 2 to 3 weeks after the surgery.

## 2021-05-21 RX ORDER — MESALAMINE 1.2 G/1
TABLET, DELAYED RELEASE ORAL
Qty: 360 TABLET | Refills: 0 | Status: SHIPPED | OUTPATIENT
Start: 2021-05-21 | End: 2021-10-29 | Stop reason: SDUPTHER

## 2021-08-18 ENCOUNTER — TELEPHONE (OUTPATIENT)
Dept: GASTROENTEROLOGY | Facility: CLINIC | Age: 66
End: 2021-08-18

## 2021-08-18 NOTE — TELEPHONE ENCOUNTER
She needs to contact her surgeon, the physician that is performing the procedure to find out when they recommend she hold or have her last dose of Remicade.  We generally leave this recommendation to the surgeons and she needs to have this information before her surgery.  Please ask her to contact our office if we may be of any assistance or if her surgeon has questions or we can help in any additional way.    Vicki

## 2021-08-18 NOTE — TELEPHONE ENCOUNTER
Kristen,  Patient is scheduled for a partial vaginectomy with Dr Hyde on 09/14.  She is due for the next Remicade infusion approximately 09/06/21.  Is it ok to get the infusion on this date which is eight days prior to her surgery?  yes we care ok with timing if they are. Typically would recommend that she have infusion 4 to 6 weeks before her surgery and then once cleared by a surgeon, resume with her infusions postoperatively once she is cleared by the surgeon. But yes, we are comfortable if surgeon is okay with that timing. Also some surgeons want her to hold an infusion for 8 weeks, skipping a dose before surgery. We are okay with what ever the surgeon recommends at this time. Also we can help with recommendations if needed FinaTerriGAMALIEL  Discussed recommendations with patient.  She will discuss with home health and may go ahead and get the infusion.  She will discuss with her surgeon when to resume post surgery.   pk

## 2021-08-27 ENCOUNTER — HOSPITAL ENCOUNTER (OUTPATIENT)
Dept: BONE DENSITY | Facility: HOSPITAL | Age: 66
Discharge: HOME | End: 2021-08-27
Admitting: OBSTETRICS & GYNECOLOGY

## 2021-08-27 ENCOUNTER — TELEPHONE (OUTPATIENT)
Dept: GASTROENTEROLOGY | Facility: CLINIC | Age: 66
End: 2021-08-27

## 2021-08-27 DIAGNOSIS — Z78.0 POSTMENOPAUSAL STATE: ICD-10-CM

## 2021-08-27 PROCEDURE — 77080 DXA BONE DENSITY AXIAL: CPT

## 2021-08-31 ENCOUNTER — TELEPHONE (OUTPATIENT)
Dept: OBSTETRICS AND GYNECOLOGY | Facility: CLINIC | Age: 66
End: 2021-08-31

## 2021-08-31 NOTE — TELEPHONE ENCOUNTER
Called patient to review most recent bone density scan.  Her 10-year risk of major fracture is 20% and discussed with her that treatment is recommended for bone loss.  She does have a history of ulcerative colitis and is currently on Remicade.  Discussed with her that I would recommend Reclast for treatment, but I would like for her to make sure she is fine to take this medication with her Remicade.  She states she will contact her gastroenterologist and will let us know if she would like to proceed with Reclast.

## 2021-09-01 DIAGNOSIS — M85.80 OSTEOPENIA WITH HIGH RISK OF FRACTURE: Primary | ICD-10-CM

## 2021-09-01 RX ORDER — SODIUM CHLORIDE 9 MG/ML
250 INJECTION, SOLUTION INTRAVENOUS ONCE
Status: CANCELLED | OUTPATIENT
Start: 2021-09-01

## 2021-09-03 ENCOUNTER — TELEPHONE (OUTPATIENT)
Dept: GASTROENTEROLOGY | Facility: CLINIC | Age: 66
End: 2021-09-03

## 2021-09-03 ENCOUNTER — TELEPHONE (OUTPATIENT)
Dept: OBSTETRICS AND GYNECOLOGY | Facility: CLINIC | Age: 66
End: 2021-09-03

## 2021-09-03 ENCOUNTER — HOME HEALTH ADMISSION (OUTPATIENT)
Dept: HOME HEALTH SERVICES | Facility: HOME HEALTHCARE | Age: 66
End: 2021-09-03

## 2021-09-03 DIAGNOSIS — M85.80 OSTEOPENIA WITH HIGH RISK OF FRACTURE: Primary | ICD-10-CM

## 2021-09-03 NOTE — TELEPHONE ENCOUNTER
We can try.  Please enter a referral for Home Health services, as well as a therapy plan for Reclast.     Pt # [phone]

## 2021-09-03 NOTE — TELEPHONE ENCOUNTER
----- Message from Helen Boyer MD sent at 9/3/2021  8:41 AM EDT -----  Tim Rojas,    I had ordered Reclast for this patient and she is wondering if home health can come to her house and do it instead her going to the infusion center.  Can you look into this?    Dr. Boyer

## 2021-09-03 NOTE — TELEPHONE ENCOUNTER
Referral set for scheduling.  Not sure if infusions (Reclast) can be done in patient's home, but will find out.

## 2021-09-09 ENCOUNTER — OFFICE VISIT (OUTPATIENT)
Dept: GASTROENTEROLOGY | Facility: CLINIC | Age: 66
End: 2021-09-09

## 2021-09-09 DIAGNOSIS — K51.011 ULCERATIVE PANCOLITIS WITH RECTAL BLEEDING (HCC): Primary | ICD-10-CM

## 2021-09-09 DIAGNOSIS — Z79.899 HIGH RISK MEDICATION USE: ICD-10-CM

## 2021-09-09 PROCEDURE — 99443 PR PHYS/QHP TELEPHONE EVALUATION 21-30 MIN: CPT | Performed by: NURSE PRACTITIONER

## 2021-09-09 NOTE — TELEPHONE ENCOUNTER
This was the reply I received from [facility]:    Thank you for the referral but we don't do speciality infusions. Since she already has [facility] for her Remicade they might be able to do the other.    Please route Prolia and Reclast injections/infusions requests to Cindy.  She does those prior authorizations and scheduling.      Thanks,  -Crystal

## 2021-09-09 NOTE — PROGRESS NOTES
Chief Complaint   Patient presents with   • Follow-up     On Remicade, restless leg with Benadryl that is given during Remicade infusions   • Ulcerative Colitis         History of Present Illness  66-year-old female presents today for follow-up regarding ulcerative colitis.  Last visit June 2020.  She was started on infliximab January 2020.  She presents today for routine follow-up.  Overall she is doing well from a GI standpoint.  Last in fusion August 31, 2021.  She denies injection site reaction or unwanted side effects.  She does not notice change in symptoms prior to or after her infusion.    She does experience restless leg with Benadryl administration during her infusions.  Benadryl is given IV.  She experiences worsening somnolence if she takes Benadryl orally.    She has recently been prescribed request with plans to start infusions for diagnosis of osteoporosis.    She has questions about B12 supplementation, she has not had recent B12 blood test.  She has had recent labs with normal CBC with no anemia noted.    Previous treatments include mesalamine and azathioprine.    She is scheduled for surgery September 14, 2021 that includes partial vaginectomy with Dr. Hyde for cystocele, rectocele, vaginal vault prolapse and incomplete bladder emptying.    You have chosen to receive care through a telephone visit.   Do you consent to use a telephone visit for your medical care today? Yes     Result Review :      COMPREHENSIVE METABOLIC PANEL (05/07/2021 09:23)   CBC AND DIFFERENTIAL (08/20/2021 09:32)       Physical Exam - TELEPHONE VISIT    Assessment and Plan    Diagnoses and all orders for this visit:    1. Ulcerative pancolitis with rectal bleeding (CMS/HCC) (Primary)  -     Vitamin B12    2. High risk medication use    She is doing well from a GI standpoint with good control of symptoms with Remicade infusions every 8 weeks.  Given upcoming gynecological surgery, will defer colonoscopy until 2022  approximately 1 year after surgery unless clinically indicated or she has been cleared from her surgeon and colonoscopy is warranted sooner.  This can also be delayed based on COVID-19 pandemic and the time comes.    Patient is up-to-date with high risk medication monitoring, blood work reviewed.    Recommend follow-up March 2021 for longstanding ulcerative colitis and routine monitoring.    We will check B12 level with next blood work, order mailed to home address.  If B12 level is normal, do not recommend additional supplementation.  If B12 level is low, will contact patient with additional recommendations.    For restless leg during Remicade infusions with Benadryl administration, patient will discuss with her home health nurse decreasing dosage of Benadryl to 12.5 or 6.25 to see if this causes symptoms.  If she still has symptoms despite lower dose, we can consider avoiding Benadryl injections at the time of Remicade, patient is agreeable to contact the office with an update.    Patient verbalized agreement and understanding with the above plan.  All questions answered and support provided.        This visit has been rescheduled as a phone visit to comply with patient safety concerns in accordance with CDC recommendations. Total time of discussion was 28 minutes.    EMR Dragon/Transcription Disclaimer:  This document has been Dictated utilizing Dragon dictation.

## 2021-09-15 ENCOUNTER — MEDICATION THERAPY MANAGEMENT (OUTPATIENT)
Dept: OBSTETRICS AND GYNECOLOGY | Facility: CLINIC | Age: 66
End: 2021-09-15

## 2021-09-15 DIAGNOSIS — M81.0 AGE-RELATED OSTEOPOROSIS WITHOUT CURRENT PATHOLOGICAL FRACTURE: Primary | ICD-10-CM

## 2021-09-15 RX ORDER — ZOLEDRONIC ACID 5 MG/100ML
5 INJECTION, SOLUTION INTRAVENOUS ONCE
Status: DISCONTINUED | OUTPATIENT
Start: 2021-09-15 | End: 2022-05-02

## 2021-09-23 NOTE — TELEPHONE ENCOUNTER
Spoke with pt to see if this has been done, she states she has just had surgery and is waiting for [facility] to call her back to set up infusion. They wanted to wait a little bit after surgery.kassidy

## 2021-10-22 ENCOUNTER — TELEPHONE (OUTPATIENT)
Dept: GASTROENTEROLOGY | Facility: CLINIC | Age: 66
End: 2021-10-22

## 2021-10-22 NOTE — TELEPHONE ENCOUNTER
Informed [facility] that we did not order Reclast for this patient.  This was ordered by Helen Boyer MD.  We ordered the Remicade Infusion. pk

## 2021-10-29 RX ORDER — MESALAMINE 1.2 G/1
4.8 TABLET, DELAYED RELEASE ORAL DAILY
Qty: 360 TABLET | Refills: 0 | Status: SHIPPED | OUTPATIENT
Start: 2021-10-29 | End: 2022-02-07

## 2021-10-29 NOTE — TELEPHONE ENCOUNTER
----- Message from GAMALIEL Gray sent at 10/29/2021 10:00 AM EDT -----  Regarding: FW: Mesalamine dr      ----- Message -----  From: Sherin Herrera MA  Sent: 10/29/2021   7:58 AM EDT  To: GAMALIEL Bach  Subject: FW: Mesalamine dr                                    ----- Message -----  From: Geetha Lazcano  Sent: 10/29/2021   5:42 AM EDT  To: [facility]  Subject: Mesalamine dr                                    Good morning!  I'm out.  No refills.  I've called it in the [facility].  Thank you.

## 2022-02-05 DIAGNOSIS — K51.011 ULCERATIVE PANCOLITIS WITH RECTAL BLEEDING: Primary | ICD-10-CM

## 2022-02-07 RX ORDER — MESALAMINE 1.2 G/1
TABLET, DELAYED RELEASE ORAL
Qty: 360 TABLET | Refills: 2 | Status: SHIPPED | OUTPATIENT
Start: 2022-02-07 | End: 2022-10-26

## 2022-03-24 ENCOUNTER — TELEPHONE (OUTPATIENT)
Dept: GASTROENTEROLOGY | Facility: CLINIC | Age: 67
End: 2022-03-24

## 2022-04-08 ENCOUNTER — TELEPHONE (OUTPATIENT)
Dept: GASTROENTEROLOGY | Facility: CLINIC | Age: 67
End: 2022-04-08

## 2022-04-08 NOTE — TELEPHONE ENCOUNTER
Please call [phone] or [phone] ext [phone]. Asking if it is ok to start rounding the dosage to avoid waste of medication/vial. She is on remicade.  Ok to round up per CC./pk

## 2022-05-02 ENCOUNTER — TELEPHONE (OUTPATIENT)
Dept: OBSTETRICS AND GYNECOLOGY | Facility: CLINIC | Age: 67
End: 2022-05-02

## 2022-05-02 ENCOUNTER — OFFICE VISIT (OUTPATIENT)
Dept: OBSTETRICS AND GYNECOLOGY | Facility: CLINIC | Age: 67
End: 2022-05-02

## 2022-05-02 VITALS
DIASTOLIC BLOOD PRESSURE: 75 MMHG | BODY MASS INDEX: 30.11 KG/M2 | WEIGHT: 170 LBS | SYSTOLIC BLOOD PRESSURE: 120 MMHG | HEART RATE: 71 BPM

## 2022-05-02 DIAGNOSIS — Z01.419 ENCOUNTER FOR GYNECOLOGICAL EXAMINATION: Primary | ICD-10-CM

## 2022-05-02 DIAGNOSIS — M81.0 AGE-RELATED OSTEOPOROSIS WITHOUT CURRENT PATHOLOGICAL FRACTURE: Primary | ICD-10-CM

## 2022-05-02 DIAGNOSIS — M81.0 AGE-RELATED OSTEOPOROSIS WITHOUT CURRENT PATHOLOGICAL FRACTURE: ICD-10-CM

## 2022-05-02 PROCEDURE — 99397 PER PM REEVAL EST PAT 65+ YR: CPT | Performed by: OBSTETRICS & GYNECOLOGY

## 2022-05-02 RX ORDER — SACUBITRIL AND VALSARTAN 24; 26 MG/1; MG/1
1 TABLET, FILM COATED ORAL 2 TIMES DAILY
COMMUNITY
Start: 2022-04-11

## 2022-05-02 RX ORDER — CARVEDILOL 6.25 MG/1
6.25 TABLET ORAL 2 TIMES DAILY
COMMUNITY
Start: 2022-03-06

## 2022-05-02 RX ORDER — CHLORAL HYDRATE 500 MG
1 CAPSULE ORAL DAILY
COMMUNITY

## 2022-05-02 RX ORDER — LORATADINE 10 MG/1
10 CAPSULE, LIQUID FILLED ORAL DAILY
COMMUNITY

## 2022-05-02 RX ORDER — ATORVASTATIN CALCIUM 40 MG/1
40 TABLET, FILM COATED ORAL DAILY
COMMUNITY
Start: 2022-04-26

## 2022-05-02 RX ORDER — FLUTICASONE PROPIONATE 50 MCG
2 SPRAY, SUSPENSION (ML) NASAL DAILY
COMMUNITY

## 2022-05-02 RX ORDER — ZOLEDRONIC ACID 5 MG/100ML
5 INJECTION, SOLUTION INTRAVENOUS ONCE
Status: DISCONTINUED | OUTPATIENT
Start: 2022-05-02 | End: 2022-05-03

## 2022-05-02 NOTE — TELEPHONE ENCOUNTER
Tim Rojas,  Pt was asking if the Reclast infusion had been set up yet? Please advise.  Thank you,  Adwoa

## 2022-05-02 NOTE — TELEPHONE ENCOUNTER
Pt inquiring if appointment for Reclast has been scheduled.  Appears this referral/order has been cancelled.      Please advise.    Pt # [phone]

## 2022-05-02 NOTE — PROGRESS NOTES
Chief Complaint  Annual Exam- Pap- Hysterectomy Mammo- 2021 DEXA-2021 Colonoscopy-2019    Subjective          Geetha Lazcano presents to [facility]  History of Present Illness  Patient is a 67-year-old that presents for gynecological exam.  She had surgery to correct her vaginal prolapse over the last year with urogynecology.  She states that she is no longer having any symptoms related to her prolapse.  She also had Reclast last fall due to high risk of fracture on her bone density scan and denies having any side effects.  She is currently up-to-date on her screening mammogram and colonoscopy.  She has no complaints today.  Objective   Vital Signs:   /75   Pulse 71   Wt 77.1 kg (170 lb)   BMI 30.11 kg/m²     Physical Exam  Vitals reviewed. Exam conducted with a chaperone present.   Constitutional:       Appearance: She is well-developed.   Cardiovascular:      Rate and Rhythm: Normal rate and regular rhythm.   Pulmonary:      Effort: Pulmonary effort is normal.      Breath sounds: Normal breath sounds.   Chest:   Breasts:      Right: No inverted nipple, mass, nipple discharge or skin change.      Left: No inverted nipple, mass, nipple discharge or skin change.       Abdominal:      General: There is no distension.      Palpations: Abdomen is soft.      Tenderness: There is no abdominal tenderness.   Genitourinary:     Labia:         Right: No rash, tenderness, lesion or injury.         Left: No rash, tenderness, lesion or injury.       Vagina: Normal.      Uterus: Absent.       Adnexa:         Right: No mass, tenderness or fullness.          Left: No mass, tenderness or fullness.     Neurological:      Mental Status: She is alert.        Result Review :                 Assessment and Plan    Diagnoses and all orders for this visit:    1. Encounter for gynecological examination (Primary)    2. Age-related osteoporosis without current pathological fracture    Patient was counseled  on monthly self breast exams and yearly screening mammograms for breast health.  She was counseled on Vitamin D supplementation and weight bearing exercises for bone health.    Patient will receive Reclast again this year and we will plan on repeating bone density scan next year.  BMI has not been calculated during today's encounter.          Follow Up   Return in about 1 year (around 5/2/2023) for gynecological exam.  Patient was given instructions and counseling regarding her condition or for health maintenance advice. Please see specific information pulled into the AVS if appropriate.

## 2022-05-02 NOTE — TELEPHONE ENCOUNTER
It looks like pt stated on 11/2/21 that she had infusion on that day.  She will not be due for another infusion until that time. I went ahead and sent the order to [facility], they should contact her to schedule.kassidy

## 2022-05-03 RX ORDER — ZOLEDRONIC ACID 5 MG/100ML
5 INJECTION, SOLUTION INTRAVENOUS ONCE
Status: SHIPPED | OUTPATIENT
Start: 2022-05-03

## 2022-08-02 ENCOUNTER — TELEPHONE (OUTPATIENT)
Dept: GASTROENTEROLOGY | Facility: CLINIC | Age: 67
End: 2022-08-02

## 2022-08-02 NOTE — TELEPHONE ENCOUNTER
Please call [phone]. Pt has tested positive for covid, was supposed to get remicade today. Has been put off till 8-9-22. Needing to know if this is ok. She is behind on her med. Thank you    [facility] pk  Patient was diagnosed with covid one week ago. Patient is fever free and feeling better.  She plans to get the infusion on 08/09/2022.  pk

## 2022-10-03 ENCOUNTER — OFFICE VISIT (OUTPATIENT)
Dept: GASTROENTEROLOGY | Facility: CLINIC | Age: 67
End: 2022-10-03

## 2022-10-03 ENCOUNTER — LAB (OUTPATIENT)
Dept: LAB | Facility: HOSPITAL | Age: 67
End: 2022-10-03

## 2022-10-03 VITALS
SYSTOLIC BLOOD PRESSURE: 134 MMHG | HEIGHT: 63 IN | WEIGHT: 178.8 LBS | HEART RATE: 81 BPM | TEMPERATURE: 98.2 F | DIASTOLIC BLOOD PRESSURE: 80 MMHG | OXYGEN SATURATION: 98 % | BODY MASS INDEX: 31.68 KG/M2

## 2022-10-03 DIAGNOSIS — M79.18 MYALGIA, MULTIPLE SITES: ICD-10-CM

## 2022-10-03 DIAGNOSIS — Z79.899 HIGH RISK MEDICATION USE: ICD-10-CM

## 2022-10-03 DIAGNOSIS — K51.011 ULCERATIVE PANCOLITIS WITH RECTAL BLEEDING: Primary | ICD-10-CM

## 2022-10-03 LAB
CHROMATIN AB SERPL-ACNC: <10 IU/ML (ref 0–14)
CRP SERPL-MCNC: 0.56 MG/DL (ref 0–0.5)
DEPRECATED RDW RBC AUTO: 45.3 FL (ref 37–54)
ERYTHROCYTE [DISTWIDTH] IN BLOOD BY AUTOMATED COUNT: 13.2 % (ref 12.3–15.4)
ERYTHROCYTE [SEDIMENTATION RATE] IN BLOOD: 42 MM/HR (ref 0–30)
HCT VFR BLD AUTO: 38.5 % (ref 34–46.6)
HGB BLD-MCNC: 12.1 G/DL (ref 12–15.9)
MCH RBC QN AUTO: 29.8 PG (ref 26.6–33)
MCHC RBC AUTO-ENTMCNC: 31.4 G/DL (ref 31.5–35.7)
MCV RBC AUTO: 94.8 FL (ref 79–97)
PLATELET # BLD AUTO: 398 10*3/MM3 (ref 140–450)
PMV BLD AUTO: 9.2 FL (ref 6–12)
RBC # BLD AUTO: 4.06 10*6/MM3 (ref 3.77–5.28)
WBC NRBC COR # BLD: 9.72 10*3/MM3 (ref 3.4–10.8)

## 2022-10-03 PROCEDURE — 80053 COMPREHEN METABOLIC PANEL: CPT | Performed by: NURSE PRACTITIONER

## 2022-10-03 PROCEDURE — 80230 DRUG ASSAY INFLIXIMAB: CPT | Performed by: NURSE PRACTITIONER

## 2022-10-03 PROCEDURE — 85027 COMPLETE CBC AUTOMATED: CPT | Performed by: NURSE PRACTITIONER

## 2022-10-03 PROCEDURE — 82397 CHEMILUMINESCENT ASSAY: CPT | Performed by: NURSE PRACTITIONER

## 2022-10-03 PROCEDURE — 85652 RBC SED RATE AUTOMATED: CPT | Performed by: NURSE PRACTITIONER

## 2022-10-03 PROCEDURE — 36415 COLL VENOUS BLD VENIPUNCTURE: CPT | Performed by: NURSE PRACTITIONER

## 2022-10-03 PROCEDURE — 86431 RHEUMATOID FACTOR QUANT: CPT | Performed by: NURSE PRACTITIONER

## 2022-10-03 PROCEDURE — 86200 CCP ANTIBODY: CPT | Performed by: NURSE PRACTITIONER

## 2022-10-03 PROCEDURE — 86140 C-REACTIVE PROTEIN: CPT | Performed by: NURSE PRACTITIONER

## 2022-10-03 PROCEDURE — 99214 OFFICE O/P EST MOD 30 MIN: CPT | Performed by: NURSE PRACTITIONER

## 2022-10-03 NOTE — PROGRESS NOTES
"Chief Complaint   Patient presents with   • Follow-up     Ulcerative Colitis and muscle pains           History of Present Illness  67-year-old female presents today for follow-up.  Last visit September 9, 2021.  She has longstanding diagnosis of pan ulcerative colitis, started on infliximab January 2020.  10/2021 GYN surgery    She is doing well from a GI standpoint.  She is due for her next infliximab infusion October 4, 2022.  BM 2-3 times per day. No blood, diarrhea or urgency.  She does not notice any change in GI symptoms prior to or after her infusions.    She has been following with a chiropractor for pain of her right bicep.  Denies trauma to the area.  It is painful when she lifts her arm.  SHe was told by her chiropractor that she had x-ray in her cervical spine.  Chiropractor therapy did not provide improvement in symptoms.  This only occurs on her right bicep.    She also has discomfort in her right and left buttock with walking and when she is carrying her granddaughter.  She feels weak and as though the area will not support her.  Pain started after extensive GYN surgery October 2021.    Denies swelling or erythema to the areas.  No previous rheumatology evaluation.  No previous orthopedic evaluation.  No recent physical therapy.    Previous treatments include azathioprine, prednisone, mesalamine.    Patient discontinued azathioprine 2017 in the past given concern for side effect, family history of lymphoma in her father.    Review of Systems      Result Review :       COMPREHENSIVE METABOLIC PANEL (07/21/2022 09:05)   CBC AND DIFFERENTIAL (07/21/2022 09:05)       Vital Signs:   /80   Pulse 81   Temp 98.2 °F (36.8 °C)   Ht 160 cm (63\")   Wt 81.1 kg (178 lb 12.8 oz)   SpO2 98%   BMI 31.67 kg/m²     Body mass index is 31.67 kg/m².     Physical Exam  Vitals reviewed.   Constitutional:       Appearance: Normal appearance.   Abdominal:      General: Bowel sounds are normal. There is no " distension.      Palpations: Abdomen is soft. Abdomen is not rigid. There is no mass or pulsatile mass.      Tenderness: There is no abdominal tenderness. There is no guarding or rebound.   Musculoskeletal:      Comments: Bilateral buttocks, no erythema, pinpoint tenderness or pain with positioning   Skin:     General: Skin is warm and dry.      Coloration: Skin is not jaundiced.           Assessment and Plan    Diagnoses and all orders for this visit:    1. Ulcerative pancolitis with rectal bleeding (HCC) (Primary)  -     CBC (No Diff)  -     Cyclic Citrul Peptide Antibody, IgG / IgA  -     Comp. Metabolic Panel (12) ([facility])  -     C-reactive Protein  -     Sedimentation Rate  -     Rheumatoid Factor  -     Infliximab and Anti-Infliximab AB DoseASSURE IFX    2. Myalgia, multiple sites  -     CBC (No Diff)  -     Cyclic Citrul Peptide Antibody, IgG / IgA  -     Comp. Metabolic Panel (12) ([facility])  -     C-reactive Protein  -     Sedimentation Rate  -     Rheumatoid Factor  -     Infliximab and Anti-Infliximab AB DoseASSURE IFX    3. High risk medication use  -     CBC (No Diff)  -     Cyclic Citrul Peptide Antibody, IgG / IgA  -     Comp. Metabolic Panel (12) ([facility])  -     C-reactive Protein  -     Sedimentation Rate  -     Rheumatoid Factor  -     Infliximab and Anti-Infliximab AB DoseASSURE IFX       Will obtain blood work checking infliximab therapeutic drug monitoring and rheumatology blood work.    Proceed with Remicade as scheduled tomorrow.    Will review at today's visit, blood work and symptoms with Dr. Estevez once blood work has resulted and contact patient with additional recommendations.    To consider physical therapy.    To consider rheumatology consult, to consider orthopedic consult.    Additional recommendations will be made based on results of the above work-up and clinical course.    ADDENDUM:   10/16/2022:   discussed blood work results with patient via telephone.  Low infliximab level,  high infliximab antibodies.     Need to switch biologic maintenance therapy.  Recommend Stelara.    orders placed for hepatitis B and QuantiFERON gold.    We will email Stelara insurance paperwork for you to sign that will allow us to begin insurance approval once blood test have resulted.  We will try to arrange the first Stelara infusion with [facility].  Vicki      Patient Instructions   Obtain bloodwork orders placed    Proceed with remicade infusion tomorrow as scheduled    Once bloodwork has resulted, will review with Dr Estevez and Further recommendations will be made pending the results of the above work up and clinical course.            EMR Dragon/Transcription Disclaimer:  This document has been Dictated utilizing Dragon dictation.

## 2022-10-03 NOTE — PATIENT INSTRUCTIONS
Obtain bloodwork orders placed    Proceed with remicade infusion tomorrow as scheduled    Once bloodwork has resulted, will review with Dr Estevez and Further recommendations will be made pending the results of the above work up and clinical course.

## 2022-10-04 LAB
ALBUMIN SERPL-MCNC: 4.3 G/DL (ref 3.8–4.8)
ALBUMIN/GLOB SERPL: 1.4 {RATIO} (ref 1.2–2.2)
ALP SERPL-CCNC: 90 IU/L (ref 44–121)
AST SERPL-CCNC: 18 IU/L (ref 0–40)
BILIRUB SERPL-MCNC: 0.4 MG/DL (ref 0–1.2)
BUN SERPL-MCNC: 9 MG/DL (ref 8–27)
BUN/CREAT SERPL: 16 (ref 12–28)
CALCIUM SERPL-MCNC: 9.3 MG/DL (ref 8.7–10.3)
CHLORIDE SERPL-SCNC: 102 MMOL/L (ref 96–106)
CREAT SERPL-MCNC: 0.56 MG/DL (ref 0.57–1)
EGFRCR SERPLBLD CKD-EPI 2021: 101 ML/MIN/1.73
GLOBULIN SER CALC-MCNC: 3 G/DL (ref 1.5–4.5)
GLUCOSE SERPL-MCNC: 84 MG/DL (ref 70–99)
POTASSIUM SERPL-SCNC: 4.2 MMOL/L (ref 3.5–5.2)
PROT SERPL-MCNC: 7.3 G/DL (ref 6–8.5)
SODIUM SERPL-SCNC: 141 MMOL/L (ref 134–144)

## 2022-10-05 LAB — CCP IGA+IGG SERPL IA-ACNC: 6 UNITS (ref 0–19)

## 2022-10-11 LAB
INFLIXIMAB AB SERPL-MCNC: 1996 NG/ML
INFLIXIMAB SERPL-MCNC: <0.4 UG/ML

## 2022-10-12 ENCOUNTER — LAB (OUTPATIENT)
Dept: LAB | Facility: HOSPITAL | Age: 67
End: 2022-10-12

## 2022-10-12 DIAGNOSIS — Z79.899 HIGH RISK MEDICATION USE: Primary | ICD-10-CM

## 2022-10-12 DIAGNOSIS — K51.011 ULCERATIVE PANCOLITIS WITH RECTAL BLEEDING: ICD-10-CM

## 2022-10-12 LAB
HAV IGM SERPL QL IA: NORMAL
HBV CORE IGM SERPL QL IA: NORMAL
HBV SURFACE AG SERPL QL IA: NORMAL
HCV AB SER DONR QL: NORMAL

## 2022-10-12 PROCEDURE — 80074 ACUTE HEPATITIS PANEL: CPT | Performed by: NURSE PRACTITIONER

## 2022-10-12 PROCEDURE — 36415 COLL VENOUS BLD VENIPUNCTURE: CPT | Performed by: NURSE PRACTITIONER

## 2022-10-12 PROCEDURE — 86480 TB TEST CELL IMMUN MEASURE: CPT | Performed by: NURSE PRACTITIONER

## 2022-10-14 LAB
GAMMA INTERFERON BACKGROUND BLD IA-ACNC: 0.02 IU/ML
M TB IFN-G BLD-IMP: NEGATIVE
M TB IFN-G CD4+ BCKGRND COR BLD-ACNC: 0.03 IU/ML
M TB IFN-G CD4+CD8+ BCKGRND COR BLD-ACNC: 0.04 IU/ML
MITOGEN IGNF BLD-ACNC: >10 IU/ML
QUANTIFERON INCUBATION: NORMAL
SERVICE CMNT-IMP: NORMAL

## 2022-10-26 DIAGNOSIS — K51.011 ULCERATIVE PANCOLITIS WITH RECTAL BLEEDING: ICD-10-CM

## 2022-10-26 RX ORDER — MESALAMINE 1.2 G/1
TABLET, DELAYED RELEASE ORAL
Qty: 360 TABLET | Refills: 2 | Status: SHIPPED | OUTPATIENT
Start: 2022-10-26

## 2022-10-31 ENCOUNTER — TELEPHONE (OUTPATIENT)
Dept: OBSTETRICS AND GYNECOLOGY | Facility: CLINIC | Age: 67
End: 2022-10-31

## 2022-10-31 DIAGNOSIS — M81.0 AGE-RELATED OSTEOPOROSIS WITHOUT CURRENT PATHOLOGICAL FRACTURE: Primary | ICD-10-CM

## 2022-10-31 RX ORDER — ZOLEDRONIC ACID 5 MG/100ML
5 INJECTION, SOLUTION INTRAVENOUS ONCE
Status: SHIPPED | OUTPATIENT
Start: 2022-10-31

## 2022-10-31 NOTE — TELEPHONE ENCOUNTER
----- Message from Helen Boyer MD sent at 10/21/2022  3:38 PM EDT -----  Regarding: FW: Recdorcast  Contact: [phone]  Are you able to set this patient up for Reclast through home health?  ----- Message -----  From: Aric Jones CMA  Sent: 10/21/2022   3:04 PM EDT  To: Helen Boyer MD  Subject: Reclast                                          Pt would like home health       ----- Message -----  From: Geetha Lazcano  Sent: 10/21/2022   2:59 PM EDT  To: [facility]  Subject: Reclast                                          Yes please- [facility].  Dave is my nurse.

## 2022-11-10 DIAGNOSIS — K51.011 ULCERATIVE PANCOLITIS WITH RECTAL BLEEDING: Primary | ICD-10-CM

## 2022-11-15 ENCOUNTER — TELEPHONE (OUTPATIENT)
Dept: OBSTETRICS AND GYNECOLOGY | Facility: CLINIC | Age: 67
End: 2022-11-15

## 2022-11-15 DIAGNOSIS — M81.0 AGE-RELATED OSTEOPOROSIS WITHOUT CURRENT PATHOLOGICAL FRACTURE: Primary | ICD-10-CM

## 2022-11-15 DIAGNOSIS — M85.80 OSTEOPENIA WITH HIGH RISK OF FRACTURE: ICD-10-CM

## 2022-11-15 RX ORDER — SODIUM CHLORIDE 9 MG/ML
250 INJECTION, SOLUTION INTRAVENOUS ONCE
Status: CANCELLED | OUTPATIENT
Start: 2022-11-15

## 2022-11-15 RX ORDER — ZOLEDRONIC ACID 5 MG/100ML
5 INJECTION, SOLUTION INTRAVENOUS ONCE
Status: CANCELLED | OUTPATIENT
Start: 2022-11-15

## 2022-11-15 NOTE — TELEPHONE ENCOUNTER
[facility] pharmacy is calling to let provider know that they cannot fill Rx - zoledronic acid (RECLAST) infusion 5 mg [64629]    At this time.     Thank you

## 2022-11-15 NOTE — TELEPHONE ENCOUNTER
[facility] states that they have never given Pt Reclast and that they do not have a contract to give that.  Per pt she has a text message from her nurse Dave from last year on 11/1/22 stating she will have her Reclast infusion the next day.  Pt will check with him about this.  I advised patient that we usually schedule patients at the hospital for Reclast and this goes very smoothly.  Patient is agreeable to having this set up.  Orders placed.jmr    , please sign Therapy plan.    Cindy

## 2022-11-22 ENCOUNTER — APPOINTMENT (OUTPATIENT)
Dept: GENERAL RADIOLOGY | Facility: HOSPITAL | Age: 67
End: 2022-11-22

## 2022-11-22 ENCOUNTER — HOSPITAL ENCOUNTER (EMERGENCY)
Facility: HOSPITAL | Age: 67
Discharge: HOME | End: 2022-11-22
Attending: EMERGENCY MEDICINE | Admitting: EMERGENCY MEDICINE

## 2022-11-22 VITALS
TEMPERATURE: 98.7 F | HEART RATE: 70 BPM | DIASTOLIC BLOOD PRESSURE: 70 MMHG | BODY MASS INDEX: 30.73 KG/M2 | OXYGEN SATURATION: 93 % | WEIGHT: 180 LBS | SYSTOLIC BLOOD PRESSURE: 145 MMHG | HEIGHT: 64 IN | RESPIRATION RATE: 18 BRPM

## 2022-11-22 DIAGNOSIS — M54.2 CERVICAL PAIN: ICD-10-CM

## 2022-11-22 DIAGNOSIS — M25.562 ACUTE PAIN OF LEFT KNEE: Primary | ICD-10-CM

## 2022-11-22 DIAGNOSIS — M17.12 ARTHRITIS OF LEFT KNEE: ICD-10-CM

## 2022-11-22 DIAGNOSIS — M54.50 LUMBAR PAIN: ICD-10-CM

## 2022-11-22 PROCEDURE — 72040 X-RAY EXAM NECK SPINE 2-3 VW: CPT

## 2022-11-22 PROCEDURE — 25010000002 HYDROMORPHONE 1 MG/ML SOLUTION: Performed by: EMERGENCY MEDICINE

## 2022-11-22 PROCEDURE — 72110 X-RAY EXAM L-2 SPINE 4/>VWS: CPT

## 2022-11-22 PROCEDURE — 73560 X-RAY EXAM OF KNEE 1 OR 2: CPT

## 2022-11-22 PROCEDURE — 99283 EMERGENCY DEPT VISIT LOW MDM: CPT

## 2022-11-22 PROCEDURE — 96372 THER/PROPH/DIAG INJ SC/IM: CPT

## 2022-11-22 RX ORDER — NAPROXEN 250 MG/1
250 TABLET ORAL 2 TIMES DAILY PRN
Qty: 20 TABLET | Refills: 0 | Status: SHIPPED | OUTPATIENT
Start: 2022-11-22

## 2022-11-22 RX ADMIN — HYDROMORPHONE HYDROCHLORIDE 1 MG: 1 INJECTION, SOLUTION INTRAMUSCULAR; INTRAVENOUS; SUBCUTANEOUS at 20:06

## 2022-11-22 NOTE — ED TRIAGE NOTES
"Patient to ER via car from home for \"my l leg gave out and I fell down 4 steps\"    Patient doesn't know if she she hit her head but thinks she blackout for a moment. Patient is not on blood thinners    Patient has been having back and leg issues    Patient wearing mask this RN in PPE  "

## 2022-11-22 NOTE — ED NOTES
PT reports she was walking up the stairs and had L leg pain that caused it to give out. Pt reports this has been on going for months, and had a fall today. Pt complaints of neck pain, back pain and L leg pain from the fall.     Patient was placed in face mask during first look triage.  Patient was wearing a face mask throughout encounter.  I wore personal protective equipment throughout the encounter.  Hand hygiene was performed before and after patient encounter.

## 2022-11-23 NOTE — ED PROVIDER NOTES
" EMERGENCY DEPARTMENT ENCOUNTER    Room Number:  20/20  Date of encounter:  11/22/2022  PCP: Celi Restrepo MD  Historian: Patient     I used full protective equipment while examining this patient.  This includes face mask, gloves and protective eyewear.  I washed my hands before entering the room and immediately upon leaving the room      HPI:  Chief Complaint: Fall, leg gave out  A complete HPI/ROS/PMH/PSH/SH/FH are unobtainable due to: None    Context: Geetha Lazcano is a 67 y.o. female who presents to the ED c/o fall, leg gave out.  Patient was walking on the stairs and her left knee gave out.  She slipped and fell backwards landing on her lower back and neck.  She complains of pain in the neck and lower back.  She also complains of increased pain to the left lower knee.  She has been having left lower knee pain ongoing for several weeks.  She was seen in the ED and had a CAT scan and Doppler ultrasound were unremarkable.  She did have a recent \"blister\" on the knee which has popped.  She is concerned about possible shingles.  She does see an physical therapist for right shoulder pain, possible rotator cuff injury.  She has not seen a specialist about her left knee which has been bothering her for over 2 weeks      MEDICAL RECORD REVIEW  I reviewed prior medical records note patient was seen in the ED on 11/10 with left leg pain.  She did have CT scan of the left leg that did not show significant abnormality.  She also had Doppler ultrasound that showed no signs of DVT.  Patient has other medical problems which include nonischemic cardiomyopathy and ulcerative colitis    PAST MEDICAL HISTORY  Active Ambulatory Problems     Diagnosis Date Noted   • Essential hypertension 07/11/2018   • Hypercholesterolemia with hypertriglyceridemia 07/11/2018   • Ulcerative colitis with rectal bleeding (HCC) 10/19/2016   • High risk medication use 03/31/2020   • Joint pain 03/31/2020   • Osteopenia with high risk of " fracture 09/01/2021   • Age-related osteoporosis without current pathological fracture 11/15/2022     Resolved Ambulatory Problems     Diagnosis Date Noted   • No Resolved Ambulatory Problems     Past Medical History:   Diagnosis Date   • Anemia    • Clotting disorder (HCC)    • Coronary artery disease    • Irritable bowel syndrome    • Lactose intolerance    • Mitral valve prolapse    • Ulcerative colitis (HCC)          PAST SURGICAL HISTORY  Past Surgical History:   Procedure Laterality Date   • HYSTERECTOMY  1992   • PACEMAKER IMPLANTATION     • TONSILLECTOMY           FAMILY HISTORY  Family History   Problem Relation Age of Onset   • Colon cancer Neg Hx    • Colon polyps Neg Hx    • Crohn's disease Neg Hx    • Irritable bowel syndrome Neg Hx    • Ulcerative colitis Neg Hx          SOCIAL HISTORY  Social History     Socioeconomic History   • Marital status:    Tobacco Use   • Smoking status: Never   • Smokeless tobacco: Never   Substance and Sexual Activity   • Alcohol use: Never   • Drug use: Never   • Sexual activity: Not Currently     Partners: Male     Birth control/protection: Abstinence, None     Comment: hysterectomy         ALLERGIES  Ondansetron, Adhesive tape, Latex, Diphenhydramine, Metronidazole, and Penicillins       REVIEW OF SYSTEMS  Review of Systems   Constitutional: Negative.  Negative for fever.   HENT: Negative.  Negative for sore throat.    Eyes: Negative.    Respiratory: Negative.  Negative for cough.    Cardiovascular: Negative.  Negative for chest pain.   Gastrointestinal: Negative.    Genitourinary: Negative.  Negative for dysuria.   Musculoskeletal: Negative.  Negative for back pain.        Left knee pain as per HPI   Skin: Negative.  Negative for rash.   Neurological: Negative.  Negative for headaches.   All other systems reviewed and are negative.          PHYSICAL EXAM    I have reviewed the triage vital signs and nursing notes.    ED Triage Vitals   Temp Heart Rate Resp BP  SpO2   11/22/22 1738 11/22/22 1738 11/22/22 1738 11/22/22 1847 11/22/22 1738   98.7 °F (37.1 °C) 78 18 149/80 99 %      Temp src Heart Rate Source Patient Position BP Location FiO2 (%)   -- -- 11/22/22 1847 11/22/22 1847 --     Sitting Right arm        Physical Exam  GENERAL: Alert and pleasant female in no obvious distress.  Triage vitals reviewed and are unremarkable  HENT: nares patent, atraumatic  EYES: no scleral icterus  CV: regular rhythm, regular rate-no murmur  RESPIRATORY: normal effort, clear to auscultation bilaterally  ABDOMEN: soft  MUSCULOSKELETAL: Spine-there is mild diffuse tenderness palpation about the cervical and lumbar spine.  No obvious bony deformity.  Upper extremities-atraumatic  Lower extremities-there is mild diffuse tenderness palpation about the left knee.  No significant swelling is noted.  No bony deformities are noted.  Range of motion goes from about 100 degrees to about 5 degrees limited by pain.  Distal strength sensation pulses grossly intact.  NEURO: Strength sensation and coordination are grossly intact.  Speech and mentation are unremarkable  SKIN: warm, dry-there is a small scab to the left drawl part of the knee.  There is no warmth erythema or redness to suggest infection.  Exam is not consistent with shingles in any way.      LAB RESULTS  No results found for this or any previous visit (from the past 24 hour(s)).    Ordered the above labs and independently reviewed the results.      RADIOLOGY  XR Knee 1 or 2 View Left, XR Spine Lumbar Complete 4+VW, XR Spine Cervical 2 or 3 View    Result Date: 11/22/2022  Cervical spine, lumbar spine and left knee radiograph  HISTORY:Back and leg pain, fall  TECHNIQUE: AP, lateral and swimmer's views of the cervical spine; AP, lateral, oblique and Spot views of the lumbar spine; AP and lateral radiographs of the left knee  COMPARISON:None      FINDINGS AND IMPRESSION: Cervical spine: No prevertebral soft tissue swelling is seen. A  pacer/AICD is incompletely visualized. No cervical spine fracture is seen. Multilevel at least mild to moderate degenerative changes are present and can be further evaluated with MRI if clinically indicated.  Lumbar spine: For the purposes of this dictation, the last well-formed disc space will be referred to as L5-S1. There is mild anterolisthesis of L4 and L5 and mild retrolisthesis of L2 on L3. No lumbar spine fracture is seen. There are multilevel at least moderate degenerative changes most notably still within the lower lumbar spine, and can be further evaluated with MRI if clinically indicated.  Left knee: Small joint effusion is present. No fracture is seen. Small osteophyte formation is present within the patellofemoral compartment.  This report was finalized on 11/22/2022 8:15 PM by Dr. Gabriel Retana M.D.        I ordered the above noted radiological studies. Reviewed by me and discussed with radiologist.  See dictation for official radiology interpretation.      PROCEDURES  Procedures      MEDICATIONS GIVEN IN ER    Medications   HYDROmorphone (DILAUDID) injection 1 mg (1 mg Intramuscular Given 11/22/22 2006)         PROGRESS, DATA ANALYSIS, CONSULTS, AND MEDICAL DECISION MAKING    All labs have been independently reviewed by me.  All radiology studies have been reviewed by me and discussed with radiologist dictating the report.   EKG's independently viewed and interpreted by me.  Discussion below represents my analysis of pertinent findings related to patient's condition, differential diagnosis, treatment plan and final disposition.      ED Course as of 11/22/22 2035   Tue Nov 22, 2022 1938 [id number] female with ongoing left knee pain presents after fall on the steps when her knee gave out.  She has had prior work-up including CT scan and ultrasound that were unremarkable.  Patient has not seen a specialist about her knee.  I am concerned about injury from fall.  She did slipped on the steps.  We  "will get plain films of the neck and lumbar spine.  We will also get films of the left knee which is having increased pain since the fall.  We will go ahead and give some IM Dilaudid to help with pain. [DB]   2023 Plain films reviewed with : Lucy.  Cervical and lumbar spine series show degenerative changes in both areas.  No obvious fracture or significant acute deformity.  Plain films of the left knee which were discussed with Dr. Retana and independently reviewed by myself do show some small effusion and arthritic changes.  No obvious fracture or large effusion noted. [DB]   2030 I discussed results of x-rays of the spine and the knee.  She has arthritis in all 3 areas.  She has not taken any anti-inflammatories because of her \"heart problems\".  She is not on strong anticoagulants and cardiomyopathy is not a reason to never take NSAIDs.  We will go ahead and prescribe short course of NSAIDs.  We will give orthopedic to follow-up with.  We will give a left knee immobilizer.  Suspect symptoms are related to left knee arthritis, possible meniscal injury.  Will likely need orthopedic evaluation with possible MRI, consider physical therapy. [DB]      ED Course User Index  [DB] Ganesh Vo MD       AS OF 20:35 EST VITALS:    BP - 142/76  HR - 81  TEMP - 98.7 °F (37.1 °C)  O2 SATS - 100%      DIAGNOSIS  Final diagnoses:   Acute pain of left knee   Arthritis of left knee   Cervical pain   Lumbar pain         DISPOSITION  DISCHARGE    Patient discharged in stable condition.    Reviewed implications of results, diagnosis, meds, responsibility to follow up, warning signs and symptoms of possible worsening, potential complications and reasons to return to ER, including increased pain or as needed.    Patient/Family voiced understanding of above instructions.    Discussed plan for discharge, as there is no emergent indication for admission. Patient referred to primary care provider for BP management due to today's " BP. Pt/family is agreeable and understands need for follow up and repeat testing.  Pt is aware that discharge does not mean that nothing is wrong but it indicates no emergency is present that requires admission and they must continue care with follow-up as given below or physician of their choice.     FOLLOW-UP  PATIENT CONNECTION - [place] [address]  [phone]    Call for Appointment    Ganesh Klein MD  [address] [address]  [phone]      Call for Appointment         Medication List      New Prescriptions    naproxen 250 MG tablet  Commonly known as: NAPROSYN  Take 1 tablet by mouth 2 (Two) Times a Day As Needed (Neck Pain).           Where to Get Your Medications      These medications were sent to [facility]/pharmacy #[id number] - [place], [place] - [address] AT [facility] - [phone]  - [phone] FX  [address] [facility] [address]    Phone: [phone]   · naproxen 250 MG tablet                Ganesh Vo MD  11/22/22 203

## 2022-12-13 NOTE — TELEPHONE ENCOUNTER
Detail Level: Generalized Gale with [facility] called stating Geetha has a Surgery for vaginal prolapse on 6/15/21. Patient is currently scheduled for 2nd Covid vaccination on 5/25/21, and a Remicaid infusion on 5/26/21. Patient would like to know if she will be able to completed the 2nd Covid vaccine and Remicaid on the dates provided, or if she needs to hold off.

## 2022-12-19 ENCOUNTER — APPOINTMENT (OUTPATIENT)
Dept: INFUSION THERAPY | Facility: HOSPITAL | Age: 67
End: 2022-12-19

## 2022-12-19 ENCOUNTER — HOSPITAL ENCOUNTER (OUTPATIENT)
Dept: INFUSION THERAPY | Facility: HOSPITAL | Age: 67
Discharge: HOME | End: 2022-12-19
Admitting: OBSTETRICS & GYNECOLOGY

## 2022-12-19 VITALS
TEMPERATURE: 96.6 F | OXYGEN SATURATION: 100 % | SYSTOLIC BLOOD PRESSURE: 142 MMHG | DIASTOLIC BLOOD PRESSURE: 89 MMHG | WEIGHT: 180 LBS | RESPIRATION RATE: 20 BRPM | HEART RATE: 80 BPM | BODY MASS INDEX: 30.9 KG/M2

## 2022-12-19 DIAGNOSIS — M85.80 OSTEOPENIA WITH HIGH RISK OF FRACTURE: ICD-10-CM

## 2022-12-19 DIAGNOSIS — M81.0 AGE-RELATED OSTEOPOROSIS WITHOUT CURRENT PATHOLOGICAL FRACTURE: Primary | ICD-10-CM

## 2022-12-19 LAB
ALBUMIN SERPL-MCNC: 4.2 G/DL (ref 3.5–5.2)
ALBUMIN/GLOB SERPL: 1.5 G/DL
ALP SERPL-CCNC: 82 U/L (ref 39–117)
ALT SERPL W P-5'-P-CCNC: 15 U/L (ref 1–33)
ANION GAP SERPL CALCULATED.3IONS-SCNC: 11.1 MMOL/L (ref 5–15)
AST SERPL-CCNC: 18 U/L (ref 1–32)
BASOPHILS # BLD AUTO: 0.05 10*3/MM3 (ref 0–0.2)
BASOPHILS NFR BLD AUTO: 0.6 % (ref 0–1.5)
BILIRUB SERPL-MCNC: 0.3 MG/DL (ref 0–1.2)
BUN SERPL-MCNC: 7 MG/DL (ref 8–23)
BUN/CREAT SERPL: 13.7 (ref 7–25)
CALCIUM SPEC-SCNC: 9.1 MG/DL (ref 8.6–10.5)
CHLORIDE SERPL-SCNC: 102 MMOL/L (ref 98–107)
CO2 SERPL-SCNC: 26.9 MMOL/L (ref 22–29)
CREAT SERPL-MCNC: 0.51 MG/DL (ref 0.57–1)
DEPRECATED RDW RBC AUTO: 43.7 FL (ref 37–54)
EGFRCR SERPLBLD CKD-EPI 2021: 103.7 ML/MIN/1.73
EOSINOPHIL # BLD AUTO: 0.39 10*3/MM3 (ref 0–0.4)
EOSINOPHIL NFR BLD AUTO: 4.9 % (ref 0.3–6.2)
ERYTHROCYTE [DISTWIDTH] IN BLOOD BY AUTOMATED COUNT: 12.6 % (ref 12.3–15.4)
GLOBULIN UR ELPH-MCNC: 2.8 GM/DL
GLUCOSE SERPL-MCNC: 101 MG/DL (ref 65–99)
HCT VFR BLD AUTO: 37.7 % (ref 34–46.6)
HGB BLD-MCNC: 12 G/DL (ref 12–15.9)
IMM GRANULOCYTES # BLD AUTO: 0.01 10*3/MM3 (ref 0–0.05)
IMM GRANULOCYTES NFR BLD AUTO: 0.1 % (ref 0–0.5)
LYMPHOCYTES # BLD AUTO: 2.52 10*3/MM3 (ref 0.7–3.1)
LYMPHOCYTES NFR BLD AUTO: 31.5 % (ref 19.6–45.3)
MAGNESIUM SERPL-MCNC: 1.6 MG/DL (ref 1.6–2.4)
MCH RBC QN AUTO: 29.6 PG (ref 26.6–33)
MCHC RBC AUTO-ENTMCNC: 31.8 G/DL (ref 31.5–35.7)
MCV RBC AUTO: 92.9 FL (ref 79–97)
MONOCYTES # BLD AUTO: 0.47 10*3/MM3 (ref 0.1–0.9)
MONOCYTES NFR BLD AUTO: 5.9 % (ref 5–12)
NEUTROPHILS NFR BLD AUTO: 4.55 10*3/MM3 (ref 1.7–7)
NEUTROPHILS NFR BLD AUTO: 57 % (ref 42.7–76)
NRBC BLD AUTO-RTO: 0 /100 WBC (ref 0–0.2)
PHOSPHATE SERPL-MCNC: 3.3 MG/DL (ref 2.5–4.5)
PLATELET # BLD AUTO: 392 10*3/MM3 (ref 140–450)
PMV BLD AUTO: 9.4 FL (ref 6–12)
POTASSIUM SERPL-SCNC: 4 MMOL/L (ref 3.5–5.2)
PROT SERPL-MCNC: 7 G/DL (ref 6–8.5)
RBC # BLD AUTO: 4.06 10*6/MM3 (ref 3.77–5.28)
SODIUM SERPL-SCNC: 140 MMOL/L (ref 136–145)
WBC NRBC COR # BLD: 7.99 10*3/MM3 (ref 3.4–10.8)

## 2022-12-19 PROCEDURE — 96365 THER/PROPH/DIAG IV INF INIT: CPT

## 2022-12-19 PROCEDURE — 83735 ASSAY OF MAGNESIUM: CPT | Performed by: OBSTETRICS & GYNECOLOGY

## 2022-12-19 PROCEDURE — 85025 COMPLETE CBC W/AUTO DIFF WBC: CPT | Performed by: OBSTETRICS & GYNECOLOGY

## 2022-12-19 PROCEDURE — 84100 ASSAY OF PHOSPHORUS: CPT | Performed by: OBSTETRICS & GYNECOLOGY

## 2022-12-19 PROCEDURE — 25010000002 ZOLEDRONIC ACID 5 MG/100ML SOLUTION: Performed by: OBSTETRICS & GYNECOLOGY

## 2022-12-19 PROCEDURE — 80053 COMPREHEN METABOLIC PANEL: CPT | Performed by: OBSTETRICS & GYNECOLOGY

## 2022-12-19 RX ORDER — ZOLEDRONIC ACID 5 MG/100ML
5 INJECTION, SOLUTION INTRAVENOUS ONCE
Status: CANCELLED | OUTPATIENT
Start: 2023-12-19

## 2022-12-19 RX ORDER — ZOLEDRONIC ACID 5 MG/100ML
5 INJECTION, SOLUTION INTRAVENOUS ONCE
Status: COMPLETED | OUTPATIENT
Start: 2022-12-19 | End: 2022-12-19

## 2022-12-19 RX ORDER — SODIUM CHLORIDE 9 MG/ML
250 INJECTION, SOLUTION INTRAVENOUS ONCE
Status: DISCONTINUED | OUTPATIENT
Start: 2022-12-19 | End: 2022-12-21 | Stop reason: HOSPADM

## 2022-12-19 RX ORDER — SODIUM CHLORIDE 9 MG/ML
250 INJECTION, SOLUTION INTRAVENOUS ONCE
OUTPATIENT
Start: 2023-12-19

## 2022-12-19 RX ADMIN — ZOLEDRONIC ACID 5 MG: 0.05 INJECTION, SOLUTION INTRAVENOUS at 10:26

## 2022-12-19 NOTE — PROGRESS NOTES
Patient tolerated Reclast infusion without difficulty. No s/s of reaction and patient discharged per ambulation at 1047.

## 2023-05-08 ENCOUNTER — OFFICE VISIT (OUTPATIENT)
Dept: OBSTETRICS AND GYNECOLOGY | Facility: CLINIC | Age: 68
End: 2023-05-08
Payer: COMMERCIAL

## 2023-05-08 VITALS
SYSTOLIC BLOOD PRESSURE: 117 MMHG | HEIGHT: 64 IN | WEIGHT: 181 LBS | HEART RATE: 78 BPM | DIASTOLIC BLOOD PRESSURE: 76 MMHG | BODY MASS INDEX: 30.9 KG/M2

## 2023-05-08 DIAGNOSIS — Z01.419 ENCOUNTER FOR GYNECOLOGICAL EXAMINATION: Primary | ICD-10-CM

## 2023-05-08 DIAGNOSIS — M81.0 AGE-RELATED OSTEOPOROSIS WITHOUT CURRENT PATHOLOGICAL FRACTURE: ICD-10-CM

## 2023-05-08 RX ORDER — USTEKINUMAB 45 MG/.5ML
INJECTION, SOLUTION SUBCUTANEOUS
COMMUNITY
End: 2023-05-08

## 2023-05-08 NOTE — PROGRESS NOTES
"Chief Complaint  Annual Exam- Pap- Hysterectomy Mammo- 2022 DEXA- 2021 Colonoscopy- 2019    Subjective        Geetha Lazcano presents to [facility]  History of Present Illness  Patient is a 68-year-old that presents for gynecological exam.  Her only complaint today is that she has noticed anal leakage with anal intercourse.  She has a new partner and recently became sexually active again after losing her  2 years ago.  She has a history of partial vaginectomy.  She denies any vaginal bleeding.  She was started on Reclast 2 years ago for osteoporosis and denies any side effects.  She does workout at least 5 days/week at the gym.  Objective   Vital Signs:  /76   Pulse 78   Ht 162.6 cm (64\")   Wt 82.1 kg (181 lb)   BMI 31.07 kg/m²   Estimated body mass index is 31.07 kg/m² as calculated from the following:    Height as of this encounter: 162.6 cm (64\").    Weight as of this encounter: 82.1 kg (181 lb).             Physical Exam  Vitals reviewed. Exam conducted with a chaperone present.   Constitutional:       Appearance: She is well-developed.   Cardiovascular:      Rate and Rhythm: Normal rate and regular rhythm.   Pulmonary:      Effort: Pulmonary effort is normal.      Breath sounds: Normal breath sounds.   Chest:   Breasts:     Right: No inverted nipple, mass, nipple discharge or skin change.      Left: No inverted nipple, mass, nipple discharge or skin change.   Abdominal:      General: There is no distension.      Palpations: Abdomen is soft.      Tenderness: There is no abdominal tenderness.   Genitourinary:     Labia:         Right: No rash, tenderness, lesion or injury.         Left: No rash, tenderness, lesion or injury.       Vagina: Normal.      Uterus: Absent.       Adnexa:         Right: No mass, tenderness or fullness.          Left: No mass, tenderness or fullness.     Neurological:      Mental Status: She is alert.        Result Review " :                   Assessment and Plan   Diagnoses and all orders for this visit:    1. Encounter for gynecological examination (Primary)    2. Age-related osteoporosis without current pathological fracture    Patient was counseled on monthly self breast exams and yearly screening mammograms for breast health.  She was counseled on Vitamin D supplementation and weight bearing exercises for bone health.  I did discuss possible pelvic floor therapy for complaints of anal leakage, but at this time she would like to wait.  I discussed recommendations to reevaluate a possible drug holiday at 3 years after starting Reclast, which will be next year.  We will plan on repeating her bone density scan next year.           Follow Up   Return in about 1 year (around 5/8/2024) for gynecological exam.  Patient was given instructions and counseling regarding her condition or for health maintenance advice. Please see specific information pulled into the AVS if appropriate.

## 2023-07-27 ENCOUNTER — TELEPHONE (OUTPATIENT)
Dept: GASTROENTEROLOGY | Facility: CLINIC | Age: 68
End: 2023-07-27
Payer: COMMERCIAL

## 2023-07-27 NOTE — TELEPHONE ENCOUNTER
----- Message from Geetha Lazcano sent at 7/27/2023  1:25 PM EDT -----  Regarding: Shingles vaccine  Contact: [phone]  Just wanted to update my records to advise that I took my first shingles injection today.  The 2nd shot is scheduled for 01/22/2024.  Thank you.

## 2023-08-08 ENCOUNTER — PATIENT MESSAGE (OUTPATIENT)
Dept: GASTROENTEROLOGY | Facility: CLINIC | Age: 68
End: 2023-08-08
Payer: COMMERCIAL

## 2023-08-08 NOTE — TELEPHONE ENCOUNTER
From: Geetha Lazcano  To: Patrick Estevez  Sent: 8/8/2023 10:43 AM EDT  Subject: Medication    Hello! I recently had a checkup with my PCP, Celi Restrepo. As usual, we discussed several things. One of which is my weight. My labs are better than they were, but my A1C is a little high too. I'm exercising, watching most of what I eat, but I still am overweight. So, she's suggested Saxenda titration or Wegovy. Now Wegovy is in a bad backorder state which leaves the Saxenda. The Saxenda would be a good fit for me since it will benefit both my labs as well as my weight. So would this medication cause me any problems with my UC? I'm doing well with the Stelara and do not want to mess that up.

## 2023-08-10 DIAGNOSIS — K51.011 ULCERATIVE PANCOLITIS WITH RECTAL BLEEDING: ICD-10-CM

## 2023-08-10 DIAGNOSIS — M81.0 AGE-RELATED OSTEOPOROSIS WITHOUT CURRENT PATHOLOGICAL FRACTURE: Primary | ICD-10-CM

## 2023-08-10 RX ORDER — MESALAMINE 1.2 G/1
TABLET, DELAYED RELEASE ORAL
Qty: 360 TABLET | Refills: 1 | Status: SHIPPED | OUTPATIENT
Start: 2023-08-10

## 2023-08-10 NOTE — TELEPHONE ENCOUNTER
Please contact the patient and let her know that we did send in a 90-day supply with 1 additional refill.  She will be due for a follow-up appointment in October, please contact the patient to schedule with GAMALIEL Barbosa.  Thank you.  Callie ALSTON

## 2023-08-11 ENCOUNTER — TELEPHONE (OUTPATIENT)
Dept: OBSTETRICS AND GYNECOLOGY | Facility: CLINIC | Age: 68
End: 2023-08-11
Payer: COMMERCIAL

## 2023-08-14 DIAGNOSIS — M81.0 AGE-RELATED OSTEOPOROSIS WITHOUT CURRENT PATHOLOGICAL FRACTURE: Primary | ICD-10-CM

## 2023-08-14 DIAGNOSIS — M85.80 OSTEOPENIA WITH HIGH RISK OF FRACTURE: ICD-10-CM

## 2023-08-14 RX ORDER — SODIUM CHLORIDE 9 MG/ML
250 INJECTION, SOLUTION INTRAVENOUS ONCE
OUTPATIENT
Start: 2023-12-12

## 2023-08-14 RX ORDER — ZOLEDRONIC ACID 5 MG/100ML
5 INJECTION, SOLUTION INTRAVENOUS ONCE
OUTPATIENT
Start: 2023-12-12

## 2023-09-19 ENCOUNTER — PATIENT MESSAGE (OUTPATIENT)
Dept: GASTROENTEROLOGY | Facility: CLINIC | Age: 68
End: 2023-09-19
Payer: COMMERCIAL

## 2023-09-19 DIAGNOSIS — K51.011 ULCERATIVE PANCOLITIS WITH RECTAL BLEEDING: ICD-10-CM

## 2023-09-19 RX ORDER — USTEKINUMAB 90 MG/ML
INJECTION, SOLUTION SUBCUTANEOUS
Qty: 1 EACH | Refills: 4 | Status: SHIPPED | OUTPATIENT
Start: 2023-09-19

## 2023-09-20 RX ORDER — METFORMIN HYDROCHLORIDE 500 MG/1
500 TABLET, EXTENDED RELEASE ORAL DAILY
Qty: 30 TABLET | Refills: 3 | COMMUNITY
Start: 2023-09-11 | End: 2024-01-09

## 2023-09-20 NOTE — TELEPHONE ENCOUNTER
From: Geetha Lazcano  To: Kristen Fisher  Sent: 9/19/2023 9:00 AM EDT  Subject: New medication    On 09- I started taking METFORMIN ER TAB 500MG GP.

## 2023-09-25 ENCOUNTER — SPECIALTY PHARMACY (OUTPATIENT)
Dept: GASTROENTEROLOGY | Facility: CLINIC | Age: 68
End: 2023-09-25

## 2023-09-25 NOTE — PROGRESS NOTES
Specialty Pharmacy     PA renewal for Stelara due 11/2/23     Radha Stock  Specialty Pharmacy Technician        
90.7

## 2023-10-02 ENCOUNTER — OFFICE VISIT (OUTPATIENT)
Dept: GASTROENTEROLOGY | Facility: CLINIC | Age: 68
End: 2023-10-02
Payer: COMMERCIAL

## 2023-10-02 ENCOUNTER — PREP FOR SURGERY (OUTPATIENT)
Dept: SURGERY | Facility: SURGERY CENTER | Age: 68
End: 2023-10-02
Payer: COMMERCIAL

## 2023-10-02 VITALS
WEIGHT: 183.7 LBS | DIASTOLIC BLOOD PRESSURE: 80 MMHG | OXYGEN SATURATION: 97 % | HEART RATE: 76 BPM | BODY MASS INDEX: 31.36 KG/M2 | SYSTOLIC BLOOD PRESSURE: 120 MMHG | HEIGHT: 64 IN | TEMPERATURE: 97.1 F

## 2023-10-02 DIAGNOSIS — Z86.010 PERSONAL HISTORY OF COLONIC POLYPS: ICD-10-CM

## 2023-10-02 DIAGNOSIS — Z12.11 SCREENING FOR MALIGNANT NEOPLASM OF COLON: ICD-10-CM

## 2023-10-02 DIAGNOSIS — K51.011 ULCERATIVE PANCOLITIS WITH RECTAL BLEEDING: Primary | ICD-10-CM

## 2023-10-02 DIAGNOSIS — Z79.899 HIGH RISK MEDICATION USE: ICD-10-CM

## 2023-10-02 DIAGNOSIS — R15.1 FECAL SOILING: ICD-10-CM

## 2023-10-02 PROCEDURE — 99214 OFFICE O/P EST MOD 30 MIN: CPT | Performed by: NURSE PRACTITIONER

## 2023-10-02 RX ORDER — SODIUM CHLORIDE, SODIUM LACTATE, POTASSIUM CHLORIDE, CALCIUM CHLORIDE 600; 310; 30; 20 MG/100ML; MG/100ML; MG/100ML; MG/100ML
30 INJECTION, SOLUTION INTRAVENOUS CONTINUOUS PRN
OUTPATIENT
Start: 2023-10-02

## 2023-10-02 RX ORDER — LIRAGLUTIDE 6 MG/ML
INJECTION, SOLUTION SUBCUTANEOUS
COMMUNITY
Start: 2023-08-18

## 2023-10-02 RX ORDER — SODIUM CHLORIDE 0.9 % (FLUSH) 0.9 %
10 SYRINGE (ML) INJECTION AS NEEDED
OUTPATIENT
Start: 2023-10-02

## 2023-10-02 RX ORDER — SODIUM CHLORIDE 0.9 % (FLUSH) 0.9 %
3 SYRINGE (ML) INJECTION EVERY 12 HOURS SCHEDULED
OUTPATIENT
Start: 2023-10-02

## 2023-10-02 NOTE — PROGRESS NOTES
Chief Complaint   Patient presents with    Follow-up     Ulcerative colitis           History of Present Illness  68-year-old female presents today for follow-up.  Last visit October 3, 2022.  She has longstanding diagnosis of pan ulcerative colitis, started on infliximab January 2020.  She is status post vaginectomy/GYN surgery October 2021.   Last EGD and colonoscopy August 5, 2019 with .    Infliximab was discontinued due to low infliximab serum concentration and high infliximab antibodies October 2022.    Ustekinumab induction infusion was initiated November 9, 2022.  She is due for her next Stelara injection October 16.   Last August 21, 2023.     She had episode of constipation a couple of weeks ago, otherwise bowel movements are 2-3 times per day. No rectal bleeding.     She is having fecal soiling/seepage that is occurring everyday since February 2023. No rectal bleeding.No issue with formed stool.      She has had diarrhea with certain frapachino drink.   If she avoids this drink symptoms do not occur.   Known lactose intolerance.      She reports compliance with her ustekinumab injections,, she denies change in symptoms prior to or after her injections.    Previous treatments include infliximab, azathioprine, prednisone, mesalamine.  Azathioprine was discontinued in 2017 due to to family history of lymphoma in her father.    On yearly reclast infusions.    Up to date with GYN.    She had her first shingles vaccine 7/27/23.  She is due for her second shingles vaccine 1/22/2024.     Result Review :       COMPREHENSIVE METABOLIC PANEL (08/02/2023 09:19)  CBC AND DIFFERENTIAL (08/02/2023 09:19)  August 5 2019.  EGD with 4 cm hiatal hernia, gastric antral erosions, mild nodular duodenitis.  Colonoscopy moderate confluent erythema and granularity extending from the rectum to the cecum.  No ulcers.    Mild sigmoid diverticulosis.    Terminal ileum appeared nodular but otherwise normal.  Biopsies from the  "right colon, transverse colon, left colon and rectum with mildly active chronic colitis, no granulomas, viral changes or dysplasia identified.  Terminal ileum biopsies with no diagnostic alteration.  Vital Signs:   /80   Pulse 76   Temp 97.1 °F (36.2 °C)   Ht 162.6 cm (64.02\")   Wt 83.3 kg (183 lb 11.2 oz)   SpO2 97%   BMI 31.52 kg/m²     Body mass index is 31.52 kg/m².     Physical Exam  Vitals reviewed.   Constitutional:       General: She is not in acute distress.     Appearance: Normal appearance. She is not ill-appearing or toxic-appearing.   Eyes:      General: No scleral icterus.  Pulmonary:      Effort: Pulmonary effort is normal. No respiratory distress.   Skin:     Coloration: Skin is not jaundiced.   Neurological:      Mental Status: She is alert and oriented to person, place, and time.   Psychiatric:         Mood and Affect: Mood normal.         Behavior: Behavior normal.         Thought Content: Thought content normal.         Judgment: Judgment normal.           Assessment and Plan    Diagnoses and all orders for this visit:    1. Ulcerative pancolitis with rectal bleeding (Primary)  -     Ambulatory Referral to Dermatology    2. High risk medication use  -     Ambulatory Referral to Dermatology    3. Fecal soiling       Ulcerative pancolitis, continue maintenance therapy with Stelara injections.    High risk medication monitoring reviewed, blood work is up-to-date.    Fecal soiling, recommend the addition of fiber supplement, written instructions provided.    If this does not provide improvement,to consider to addition of Konsyl or additional testing with anorectal manometry and defecating proctogram.    Referral placed for dermatology, recommend annual skin exams.    Orders placed for colonoscopy for surveillance, last colonoscopy 2019.    Recommend follow-up visit after endoscopic evaluation, sooner if symptoms change or condition warrants.        Patient Instructions   Recommend " starting a daily fiber supplement such as FiberCon tablets.  These can be purchased over-the-counter, generic brand is fine.  Fiber supplements can take 12 to 72 hours to start working.  Start fiber supplement at a low dose, 2 per night and gradually increase if needed based on your response.    Drink 6 to 12 ounces of water or noncarbonated beverage with fiber supplement.            EMR Dragon/Transcription Disclaimer:  This document has been Dictated utilizing Dragon dictation.

## 2023-11-02 ENCOUNTER — SPECIALTY PHARMACY (OUTPATIENT)
Dept: GASTROENTEROLOGY | Facility: CLINIC | Age: 68
End: 2023-11-02
Payer: COMMERCIAL

## 2023-11-02 NOTE — PROGRESS NOTES
Specialty Pharmacy    PA approved for Roberlara  Malhotra: [id number] - PA Case ID: [id number]  PA Case: [id number], Status: Approved, Coverage Starts on: 11/2/2023 12:00:00 AM, Coverage Ends on: 11/1/2024 12:00:00 AM.     Radha Stock  Specialty Pharmacy Technician

## 2023-12-04 PROBLEM — Z86.010 PERSONAL HISTORY OF COLONIC POLYPS: Status: ACTIVE | Noted: 2023-10-02

## 2023-12-04 PROBLEM — Z12.11 SCREENING FOR MALIGNANT NEOPLASM OF COLON: Status: ACTIVE | Noted: 2023-10-02

## 2023-12-27 ENCOUNTER — HOSPITAL ENCOUNTER (OUTPATIENT)
Dept: INFUSION THERAPY | Facility: HOSPITAL | Age: 68
Discharge: HOME | End: 2023-12-27
Admitting: OBSTETRICS & GYNECOLOGY
Payer: COMMERCIAL

## 2023-12-27 VITALS
HEART RATE: 61 BPM | SYSTOLIC BLOOD PRESSURE: 121 MMHG | WEIGHT: 169 LBS | RESPIRATION RATE: 18 BRPM | BODY MASS INDEX: 28.99 KG/M2 | DIASTOLIC BLOOD PRESSURE: 69 MMHG | OXYGEN SATURATION: 99 % | TEMPERATURE: 98 F

## 2023-12-27 DIAGNOSIS — M85.80 OSTEOPENIA WITH HIGH RISK OF FRACTURE: Primary | ICD-10-CM

## 2023-12-27 DIAGNOSIS — M81.0 AGE-RELATED OSTEOPOROSIS WITHOUT CURRENT PATHOLOGICAL FRACTURE: ICD-10-CM

## 2023-12-27 LAB
ALBUMIN SERPL-MCNC: 4.3 G/DL (ref 3.5–5.2)
ALBUMIN/GLOB SERPL: 1.3 G/DL
ALP SERPL-CCNC: 76 U/L (ref 39–117)
ALT SERPL W P-5'-P-CCNC: 15 U/L (ref 1–33)
ANION GAP SERPL CALCULATED.3IONS-SCNC: 9 MMOL/L (ref 5–15)
AST SERPL-CCNC: 14 U/L (ref 1–32)
BASOPHILS # BLD AUTO: 0.04 10*3/MM3 (ref 0–0.2)
BASOPHILS NFR BLD AUTO: 0.5 % (ref 0–1.5)
BILIRUB SERPL-MCNC: 0.5 MG/DL (ref 0–1.2)
BUN SERPL-MCNC: 7 MG/DL (ref 8–23)
BUN/CREAT SERPL: 12.7 (ref 7–25)
CALCIUM SPEC-SCNC: 9.7 MG/DL (ref 8.6–10.5)
CHLORIDE SERPL-SCNC: 101 MMOL/L (ref 98–107)
CO2 SERPL-SCNC: 29 MMOL/L (ref 22–29)
CREAT SERPL-MCNC: 0.55 MG/DL (ref 0.57–1)
DEPRECATED RDW RBC AUTO: 44.7 FL (ref 37–54)
EGFRCR SERPLBLD CKD-EPI 2021: 101.2 ML/MIN/1.73
EOSINOPHIL # BLD AUTO: 0.26 10*3/MM3 (ref 0–0.4)
EOSINOPHIL NFR BLD AUTO: 3.5 % (ref 0.3–6.2)
ERYTHROCYTE [DISTWIDTH] IN BLOOD BY AUTOMATED COUNT: 13.4 % (ref 12.3–15.4)
GLOBULIN UR ELPH-MCNC: 3.2 GM/DL
GLUCOSE SERPL-MCNC: 111 MG/DL (ref 65–99)
HCT VFR BLD AUTO: 36.7 % (ref 34–46.6)
HGB BLD-MCNC: 11.6 G/DL (ref 12–15.9)
IMM GRANULOCYTES # BLD AUTO: 0.02 10*3/MM3 (ref 0–0.05)
IMM GRANULOCYTES NFR BLD AUTO: 0.3 % (ref 0–0.5)
LYMPHOCYTES # BLD AUTO: 2.26 10*3/MM3 (ref 0.7–3.1)
LYMPHOCYTES NFR BLD AUTO: 30.1 % (ref 19.6–45.3)
MAGNESIUM SERPL-MCNC: 1.9 MG/DL (ref 1.6–2.4)
MCH RBC QN AUTO: 28.7 PG (ref 26.6–33)
MCHC RBC AUTO-ENTMCNC: 31.6 G/DL (ref 31.5–35.7)
MCV RBC AUTO: 90.8 FL (ref 79–97)
MONOCYTES # BLD AUTO: 0.47 10*3/MM3 (ref 0.1–0.9)
MONOCYTES NFR BLD AUTO: 6.3 % (ref 5–12)
NEUTROPHILS NFR BLD AUTO: 4.47 10*3/MM3 (ref 1.7–7)
NEUTROPHILS NFR BLD AUTO: 59.3 % (ref 42.7–76)
NRBC BLD AUTO-RTO: 0 /100 WBC (ref 0–0.2)
PHOSPHATE SERPL-MCNC: 3 MG/DL (ref 2.5–4.5)
PLATELET # BLD AUTO: 438 10*3/MM3 (ref 140–450)
PMV BLD AUTO: 9.6 FL (ref 6–12)
POTASSIUM SERPL-SCNC: 3.7 MMOL/L (ref 3.5–5.2)
PROT SERPL-MCNC: 7.5 G/DL (ref 6–8.5)
RBC # BLD AUTO: 4.04 10*6/MM3 (ref 3.77–5.28)
SODIUM SERPL-SCNC: 139 MMOL/L (ref 136–145)
WBC NRBC COR # BLD AUTO: 7.52 10*3/MM3 (ref 3.4–10.8)

## 2023-12-27 PROCEDURE — 80053 COMPREHEN METABOLIC PANEL: CPT | Performed by: OBSTETRICS & GYNECOLOGY

## 2023-12-27 PROCEDURE — 25010000002 ZOLEDRONIC ACID 5 MG/100ML SOLUTION: Performed by: OBSTETRICS & GYNECOLOGY

## 2023-12-27 PROCEDURE — 96374 THER/PROPH/DIAG INJ IV PUSH: CPT

## 2023-12-27 PROCEDURE — 84100 ASSAY OF PHOSPHORUS: CPT | Performed by: OBSTETRICS & GYNECOLOGY

## 2023-12-27 PROCEDURE — 85025 COMPLETE CBC W/AUTO DIFF WBC: CPT | Performed by: OBSTETRICS & GYNECOLOGY

## 2023-12-27 PROCEDURE — 36415 COLL VENOUS BLD VENIPUNCTURE: CPT

## 2023-12-27 PROCEDURE — 96365 THER/PROPH/DIAG IV INF INIT: CPT

## 2023-12-27 PROCEDURE — 83735 ASSAY OF MAGNESIUM: CPT | Performed by: OBSTETRICS & GYNECOLOGY

## 2023-12-27 RX ORDER — ZOLEDRONIC ACID 5 MG/100ML
5 INJECTION, SOLUTION INTRAVENOUS ONCE
Status: COMPLETED | OUTPATIENT
Start: 2023-12-27 | End: 2023-12-27

## 2023-12-27 RX ORDER — SODIUM CHLORIDE 9 MG/ML
250 INJECTION, SOLUTION INTRAVENOUS ONCE
OUTPATIENT
Start: 2024-12-26

## 2023-12-27 RX ORDER — SODIUM CHLORIDE 9 MG/ML
250 INJECTION, SOLUTION INTRAVENOUS ONCE
Status: DISCONTINUED | OUTPATIENT
Start: 2023-12-27 | End: 2023-12-29 | Stop reason: HOSPADM

## 2023-12-27 RX ORDER — ZOLEDRONIC ACID 5 MG/100ML
5 INJECTION, SOLUTION INTRAVENOUS ONCE
Status: CANCELLED | OUTPATIENT
Start: 2024-12-26

## 2023-12-27 RX ADMIN — ZOLEDRONIC ACID 5 MG: 5 INJECTION INTRAVENOUS at 10:23

## 2024-02-06 DIAGNOSIS — K51.011 ULCERATIVE PANCOLITIS WITH RECTAL BLEEDING: ICD-10-CM

## 2024-02-07 RX ORDER — MESALAMINE 1.2 G/1
TABLET, DELAYED RELEASE ORAL
Qty: 360 TABLET | Refills: 1 | Status: SHIPPED | OUTPATIENT
Start: 2024-02-07

## 2024-04-09 ENCOUNTER — TELEPHONE (OUTPATIENT)
Dept: GASTROENTEROLOGY | Facility: CLINIC | Age: 69
End: 2024-04-09
Payer: COMMERCIAL

## 2024-04-09 NOTE — TELEPHONE ENCOUNTER
Metformin not prescribed by our office, will need to follow up with PCP or prescribing provider for this.